# Patient Record
Sex: MALE | Race: BLACK OR AFRICAN AMERICAN | Employment: FULL TIME | URBAN - METROPOLITAN AREA
[De-identification: names, ages, dates, MRNs, and addresses within clinical notes are randomized per-mention and may not be internally consistent; named-entity substitution may affect disease eponyms.]

---

## 2018-03-09 ENCOUNTER — HOSPITAL ENCOUNTER (OUTPATIENT)
Age: 43
Discharge: HOME | End: 2018-03-09
Payer: COMMERCIAL

## 2018-03-09 ENCOUNTER — HOSPITAL ENCOUNTER (OUTPATIENT)
Dept: GENERAL RADIOLOGY | Age: 43
Discharge: HOME | End: 2018-03-11
Payer: COMMERCIAL

## 2018-03-09 DIAGNOSIS — T14.90XA INJURY: ICD-10-CM

## 2018-03-09 PROCEDURE — 73130 X-RAY EXAM OF HAND: CPT

## 2018-09-13 ENCOUNTER — HOSPITAL ENCOUNTER (EMERGENCY)
Age: 43
Discharge: HOME | End: 2018-09-14
Attending: EMERGENCY MEDICINE
Payer: COMMERCIAL

## 2018-09-13 VITALS
HEIGHT: 73 IN | SYSTOLIC BLOOD PRESSURE: 168 MMHG | OXYGEN SATURATION: 96 % | TEMPERATURE: 97.6 F | HEART RATE: 75 BPM | WEIGHT: 234.19 LBS | BODY MASS INDEX: 31.04 KG/M2 | DIASTOLIC BLOOD PRESSURE: 108 MMHG | RESPIRATION RATE: 18 BRPM

## 2018-09-13 DIAGNOSIS — L03.319 CELLULITIS AND ABSCESS OF TRUNK: Primary | ICD-10-CM

## 2018-09-13 DIAGNOSIS — L02.219 CELLULITIS AND ABSCESS OF TRUNK: Primary | ICD-10-CM

## 2018-09-13 DIAGNOSIS — T63.301A SPIDER BITE WOUND, ACCIDENTAL OR UNINTENTIONAL, INITIAL ENCOUNTER: ICD-10-CM

## 2018-09-13 PROCEDURE — 99281 EMR DPT VST MAYX REQ PHY/QHP: CPT

## 2018-09-13 RX ORDER — DOXYCYCLINE 100 MG/1
100 TABLET ORAL 2 TIMES DAILY
Qty: 20 TABLET | Refills: 0 | Status: SHIPPED | OUTPATIENT
Start: 2018-09-13 | End: 2018-09-23

## 2018-09-13 RX ORDER — KETOROLAC TROMETHAMINE 30 MG/ML
60 INJECTION, SOLUTION INTRAMUSCULAR; INTRAVENOUS ONCE
Status: COMPLETED | OUTPATIENT
Start: 2018-09-14 | End: 2018-09-14

## 2018-09-13 RX ORDER — ONDANSETRON 4 MG/1
4 TABLET, ORALLY DISINTEGRATING ORAL ONCE
Status: COMPLETED | OUTPATIENT
Start: 2018-09-14 | End: 2018-09-14

## 2018-09-13 RX ORDER — IBUPROFEN 800 MG/1
800 TABLET ORAL EVERY 8 HOURS PRN
Qty: 30 TABLET | Refills: 0 | Status: SHIPPED | OUTPATIENT
Start: 2018-09-13 | End: 2020-07-20

## 2018-09-13 RX ORDER — CLINDAMYCIN HYDROCHLORIDE 300 MG/1
300 CAPSULE ORAL 3 TIMES DAILY
Qty: 30 CAPSULE | Refills: 0 | Status: SHIPPED | OUTPATIENT
Start: 2018-09-13 | End: 2018-09-23

## 2018-09-13 RX ORDER — OXYCODONE HYDROCHLORIDE AND ACETAMINOPHEN 5; 325 MG/1; MG/1
1 TABLET ORAL ONCE
Status: COMPLETED | OUTPATIENT
Start: 2018-09-14 | End: 2018-09-14

## 2018-09-13 RX ORDER — CLINDAMYCIN HYDROCHLORIDE 150 MG/1
300 CAPSULE ORAL ONCE
Status: COMPLETED | OUTPATIENT
Start: 2018-09-14 | End: 2018-09-14

## 2018-09-13 RX ORDER — DOXYCYCLINE 100 MG/1
100 CAPSULE ORAL ONCE
Status: COMPLETED | OUTPATIENT
Start: 2018-09-14 | End: 2018-09-14

## 2018-09-13 RX ORDER — OXYCODONE HYDROCHLORIDE AND ACETAMINOPHEN 5; 325 MG/1; MG/1
1-2 TABLET ORAL EVERY 6 HOURS PRN
Qty: 20 TABLET | Refills: 0 | Status: SHIPPED | OUTPATIENT
Start: 2018-09-13 | End: 2018-09-18

## 2018-09-13 ASSESSMENT — PAIN DESCRIPTION - PAIN TYPE
TYPE: ACUTE PAIN
TYPE: ACUTE PAIN

## 2018-09-13 ASSESSMENT — PAIN DESCRIPTION - ORIENTATION
ORIENTATION: UPPER
ORIENTATION: RIGHT;UPPER

## 2018-09-13 ASSESSMENT — PAIN SCALES - GENERAL
PAINLEVEL_OUTOF10: 7
PAINLEVEL_OUTOF10: 7

## 2018-09-13 ASSESSMENT — PAIN DESCRIPTION - LOCATION
LOCATION: FLANK
LOCATION: RIB CAGE

## 2018-09-14 PROCEDURE — 96372 THER/PROPH/DIAG INJ SC/IM: CPT

## 2018-09-14 PROCEDURE — 6370000000 HC RX 637 (ALT 250 FOR IP): Performed by: EMERGENCY MEDICINE

## 2018-09-14 PROCEDURE — 6360000002 HC RX W HCPCS: Performed by: EMERGENCY MEDICINE

## 2018-09-14 RX ADMIN — DOXYCYCLINE 100 MG: 100 CAPSULE ORAL at 00:15

## 2018-09-14 RX ADMIN — OXYCODONE HYDROCHLORIDE AND ACETAMINOPHEN 1 TABLET: 5; 325 TABLET ORAL at 00:15

## 2018-09-14 RX ADMIN — CLINDAMYCIN HYDROCHLORIDE 300 MG: 150 CAPSULE ORAL at 00:15

## 2018-09-14 RX ADMIN — ONDANSETRON 4 MG: 4 TABLET, ORALLY DISINTEGRATING ORAL at 00:15

## 2018-09-14 RX ADMIN — KETOROLAC TROMETHAMINE 60 MG: 30 INJECTION, SOLUTION INTRAMUSCULAR at 00:14

## 2018-09-14 ASSESSMENT — PAIN SCALES - GENERAL: PAINLEVEL_OUTOF10: 7

## 2018-09-14 NOTE — ED NOTES
Patient walks back to room with complaint of insect bite. Patient states this has been going on for 1 week. He states that it itched initially, but has not been scratching at it since. Patient has a history of abscesses. Patient is alert and oriented x 4, walks back to room with no aids. Respirations even and unlabored, heart rate strong and regular. Patient has a reddened, raised area approximately 5 cm laterally to the right nipple. No drainage noted.      Flip Ghotra, KATHERYN  09/14/18 0125

## 2020-07-20 ENCOUNTER — OFFICE VISIT (OUTPATIENT)
Dept: FAMILY MEDICINE CLINIC | Age: 45
End: 2020-07-20
Payer: COMMERCIAL

## 2020-07-20 ENCOUNTER — HOSPITAL ENCOUNTER (OUTPATIENT)
Age: 45
Setting detail: SPECIMEN
Discharge: HOME | End: 2020-07-20
Payer: COMMERCIAL

## 2020-07-20 VITALS
WEIGHT: 227 LBS | SYSTOLIC BLOOD PRESSURE: 154 MMHG | TEMPERATURE: 97.2 F | BODY MASS INDEX: 30.09 KG/M2 | DIASTOLIC BLOOD PRESSURE: 104 MMHG | HEIGHT: 73 IN | HEART RATE: 85 BPM

## 2020-07-20 PROBLEM — Z83.3 FAMILY HISTORY OF DIABETES MELLITUS: Status: ACTIVE | Noted: 2020-07-20

## 2020-07-20 PROBLEM — I10 ESSENTIAL HYPERTENSION: Status: ACTIVE | Noted: 2020-07-20

## 2020-07-20 PROBLEM — Z13.220 SCREENING FOR HYPERLIPIDEMIA: Status: ACTIVE | Noted: 2020-07-20

## 2020-07-20 LAB
CHOLESTEROL/HDL RATIO: 4.5
CHOLESTEROL: 195 MG/DL
ESTIMATED AVERAGE GLUCOSE: 111 MG/DL
HBA1C MFR BLD: 5.5 % (ref 4–6)
HDLC SERPL-MCNC: 43 MG/DL
LDL CHOLESTEROL: 122 MG/DL (ref 0–130)
TRIGL SERPL-MCNC: 149 MG/DL
VLDLC SERPL CALC-MCNC: NORMAL MG/DL (ref 1–30)

## 2020-07-20 PROCEDURE — 99203 OFFICE O/P NEW LOW 30 MIN: CPT | Performed by: STUDENT IN AN ORGANIZED HEALTH CARE EDUCATION/TRAINING PROGRAM

## 2020-07-20 RX ORDER — AMLODIPINE BESYLATE 5 MG/1
5 TABLET ORAL DAILY
Qty: 30 TABLET | Refills: 1 | Status: SHIPPED | OUTPATIENT
Start: 2020-07-20 | End: 2020-08-14 | Stop reason: SDUPTHER

## 2020-07-20 RX ORDER — AMLODIPINE BESYLATE 5 MG/1
5 TABLET ORAL DAILY
COMMUNITY
End: 2020-07-20 | Stop reason: SDUPTHER

## 2020-07-20 ASSESSMENT — ENCOUNTER SYMPTOMS
ABDOMINAL PAIN: 0
SHORTNESS OF BREATH: 0
CHEST TIGHTNESS: 0
BACK PAIN: 0
NAUSEA: 0
WHEEZING: 0
DIARRHEA: 0
COUGH: 0
ABDOMINAL DISTENTION: 0
VOMITING: 0

## 2020-07-20 ASSESSMENT — PATIENT HEALTH QUESTIONNAIRE - PHQ9
SUM OF ALL RESPONSES TO PHQ QUESTIONS 1-9: 0
SUM OF ALL RESPONSES TO PHQ9 QUESTIONS 1 & 2: 0
2. FEELING DOWN, DEPRESSED OR HOPELESS: 0
SUM OF ALL RESPONSES TO PHQ QUESTIONS 1-9: 0
1. LITTLE INTEREST OR PLEASURE IN DOING THINGS: 0

## 2020-07-20 NOTE — PROGRESS NOTES
Subjective:      Patient ID: Emma Juares is a 54 y.o. male. Pt presents to reestablish care   Followed with dr Kaylen Shaffer years ago  Pt has hx of htn, treated w/ Norvasc, has been out of medication x 3 months  Pt denies chest pain, vision, SOB, urinary changes  Pt reports activity level - at work is active, heavy lifting, more sedentary during quarantine  Hx of remote wrist surgery, denies sequelae, hernia repair > 15 years ago, denies any issues at this time  FHx positive for dm, cancer in gm who was a smoker (lung)   ------------------------------------------               07/20/20 07/20/20                    1315            1322       ------------------------------------------   BP:        (!) 163/109    (!) 154/104     Site:    Right Upper Arm Left Upper Arm   Position:     Sitting        Sitting       Cuff Size:  Medium Adult    Medium Adult    Pulse:         85                         Temp:   97.2 °F (36.2 °C)                 TempSrc:    Temporal                      Weight:  227 lb (103 kg)                  Height:  6' 1\" (1.854 m)                 ------------------------------------------  Denies smoking cigarettes, EtOH 1/week socially, denies illicit substance use      Review of Systems   Constitutional: Negative for activity change, appetite change, chills, fever and unexpected weight change. Respiratory: Negative for cough, chest tightness, shortness of breath and wheezing. Cardiovascular: Negative for chest pain, palpitations and leg swelling. Gastrointestinal: Negative for abdominal distention, abdominal pain, diarrhea, nausea and vomiting. Genitourinary: Negative for difficulty urinating and flank pain. Musculoskeletal: Negative for arthralgias and back pain. Skin: Negative for rash. Neurological: Negative for dizziness, syncope, weakness and light-headedness. Psychiatric/Behavioral: Negative for dysphoric mood.  The patient is not nervous/anxious. Objective:   Physical Exam  Vitals signs reviewed. Constitutional:       General: He is not in acute distress. Appearance: He is well-developed. He is not diaphoretic. HENT:      Head: Normocephalic and atraumatic. Cardiovascular:      Rate and Rhythm: Normal rate and regular rhythm. Heart sounds: Normal heart sounds. No murmur. No gallop. Pulmonary:      Effort: Pulmonary effort is normal. No respiratory distress. Breath sounds: Normal breath sounds. No wheezing or rales. Abdominal:      General: Bowel sounds are normal. There is no distension. Palpations: Abdomen is soft. Tenderness: There is no abdominal tenderness. There is no guarding. Musculoskeletal: Normal range of motion. General: No tenderness. Skin:     General: Skin is warm and dry. Findings: No rash. Neurological:      Mental Status: He is alert and oriented to person, place, and time. Sensory: No sensory deficit. Psychiatric:         Judgment: Judgment normal.       Vitals:    07/20/20 1315 07/20/20 1322   BP: (!) 163/109 (!) 154/104   Site: Right Upper Arm Left Upper Arm   Position: Sitting Sitting   Cuff Size: Medium Adult Medium Adult   Pulse: 85    Temp: 97.2 °F (36.2 °C)    TempSrc: Temporal    Weight: 227 lb (103 kg)    Height: 6' 1\" (1.854 m)      Assessment:       Diagnosis Orders   1. Essential hypertension  amLODIPine (NORVASC) 5 MG tablet   2. Screening for hyperlipidemia  Lipid Panel   3.  Family history of diabetes mellitus  Hemoglobin A1C         Plan:      - Letter given per patient request outlining the work-up and treatment of uncontrolled HTN at this time  - Lipid panel ordered  - A1c ordered - fam hx of diabetes  - f/u 1 month - HTN        Ayse Fletcher MD

## 2020-07-20 NOTE — LETTER
[address]  [address]  [place] [address]  Phone: [phone]  Fax: [phone]    Stuart Bolaños MD        July 20, 2020     Patient: Toan Vega   YOB: 1975   Date of Visit: 7/20/2020       To Whom It May Concern: It is my medical opinion that Salvatore Zapien is a patient that recently established care with me as the primary care physician. At this time, the hypertension (high blood pressure) is uncontrolled, being treated with medication, and being followed up in 1 month for further reassessment with possible medication increases or changes. .    If you have any questions or concerns, please don't hesitate to call.       Sincerely,            Stuart Bolaños MD

## 2020-07-20 NOTE — PROGRESS NOTES
Visit Information    Have you changed or started any medications since your last visit including any over-the-counter medicines, vitamins, or herbal medicines? no   Have you stopped taking any of your medications? Is so, why? -  no  Are you having any side effects from any of your medications? - no    Have you seen any other physician or provider since your last visit?  no   Have you had any other diagnostic tests since your last visit?  no   Have you been seen in the emergency room and/or had an admission in a hospital since we last saw you?  no   Have you had your routine dental cleaning in the past 6 months?  no     Do you have an active MyChart account? If no, what is the barrier?   Yes    Patient Care Team:  Revonda Fleischer, MD as PCP - General (Family Medicine)    Medical History Review  Past Medical, Family, and Social History reviewed and does not contribute to the patient presenting condition    Health Maintenance   Topic Date Due    HIV screen  05/06/1989    DTaP/Tdap/Td vaccine (1 - Tdap) 05/06/1993    Lipid screen  05/06/2014    Diabetes screen  05/06/2014    Flu vaccine (1) 09/01/2020    Hepatitis A vaccine  Aged Out    Hepatitis B vaccine  Aged Out    Hib vaccine  Aged Out    Meningococcal (ACWY) vaccine  Aged Out    Pneumococcal 0-64 years Vaccine  Aged Out

## 2020-07-21 ENCOUNTER — TELEPHONE (OUTPATIENT)
Dept: FAMILY MEDICINE CLINIC | Age: 45
End: 2020-07-21

## 2020-07-22 ENCOUNTER — TELEPHONE (OUTPATIENT)
Dept: FAMILY MEDICINE CLINIC | Age: 45
End: 2020-07-22

## 2020-07-22 NOTE — TELEPHONE ENCOUNTER
Patient called in stating that his job is making him go back to work this Friday but the patient states he is at risk because of his blood pressure for the Covid-19 and he do not want to go back, patient want you to writer a letter so he don't have to go back work.

## 2020-07-23 NOTE — TELEPHONE ENCOUNTER
Addressed in other encounter, updated letter available.   Ayse Fletcher MD  [facility] Resident, PGY-3  7/23/2020

## 2020-07-24 ENCOUNTER — TELEPHONE (OUTPATIENT)
Dept: FAMILY MEDICINE CLINIC | Age: 45
End: 2020-07-24

## 2020-07-24 NOTE — TELEPHONE ENCOUNTER
If pt calls asking for an updated letter from Dr. Mounika Mock, the patient can be told that the letter is in the front office and he can pick it up today.

## 2020-08-14 ENCOUNTER — OFFICE VISIT (OUTPATIENT)
Dept: FAMILY MEDICINE CLINIC | Age: 45
End: 2020-08-14
Payer: COMMERCIAL

## 2020-08-14 VITALS
HEIGHT: 73 IN | DIASTOLIC BLOOD PRESSURE: 98 MMHG | HEART RATE: 86 BPM | WEIGHT: 236 LBS | BODY MASS INDEX: 31.28 KG/M2 | TEMPERATURE: 98.1 F | SYSTOLIC BLOOD PRESSURE: 146 MMHG

## 2020-08-14 PROCEDURE — 99213 OFFICE O/P EST LOW 20 MIN: CPT | Performed by: STUDENT IN AN ORGANIZED HEALTH CARE EDUCATION/TRAINING PROGRAM

## 2020-08-14 RX ORDER — AMLODIPINE BESYLATE 10 MG/1
10 TABLET ORAL DAILY
Qty: 30 TABLET | Refills: 1 | Status: SHIPPED | OUTPATIENT
Start: 2020-08-14 | End: 2020-09-11 | Stop reason: SDUPTHER

## 2020-08-14 ASSESSMENT — ENCOUNTER SYMPTOMS
WHEEZING: 0
ABDOMINAL PAIN: 0
DIARRHEA: 0
BACK PAIN: 0
ABDOMINAL DISTENTION: 0
NAUSEA: 0
SHORTNESS OF BREATH: 0
CHEST TIGHTNESS: 0
VOMITING: 0
COUGH: 0

## 2020-08-14 NOTE — PROGRESS NOTES
HYPERTENSION visit     BP Readings from Last 3 Encounters:   07/20/20 (!) 154/104   09/13/18 (!) 168/108       LDL Cholesterol (mg/dL)   Date Value   07/20/2020 122     HDL (mg/dL)   Date Value   07/20/2020 43              Have you changed or started any medications since your last visit including any over-the-counter medicines, vitamins, or herbal medicines? no   Have you stopped taking any of your medications? Is so, why? -  no  Are you having any side effects from any of your medications? - no  How often do you miss doses of your medication? no      Have you seen any other physician or provider since your last visit?  no   Have you had any other diagnostic tests since your last visit?  no   Have you been seen in the emergency room and/or had an admission in a hospital since we last saw you?  no   Have you had your routine dental cleaning in the past 6 months?  no     Do you have an active MyChart account? If no, what is the barrier?   Yes    Patient Care Team:  Talya Luciano MD as PCP - General (Family Medicine)    Medical History Review  Past Medical, Family, and Social History reviewed and does not contribute to the patient presenting condition    Health Maintenance   Topic Date Due    Potassium monitoring  1975    Creatinine monitoring  1975    HIV screen  05/06/1989    DTaP/Tdap/Td vaccine (1 - Tdap) 05/06/1993    Flu vaccine (1) 09/01/2020    Diabetes screen  07/20/2023    Lipid screen  07/20/2025    Hepatitis A vaccine  Aged Out    Hepatitis B vaccine  Aged Out    Hib vaccine  Aged Out    Meningococcal (ACWY) vaccine  Aged Out    Pneumococcal 0-64 years Vaccine  Aged Out

## 2020-08-14 NOTE — PROGRESS NOTES
Subjective:      Patient ID: Adonis Temple is a 54 y.o. male. Pt presents for f/u HTN  Pt has been adherent to medication  Remains uncontrolled at this time  Amenable to increased dose  -------------------------------------------               08/14/20 08/14/20                     1441            1449        -------------------------------------------   BP:        (!) 162/100     (!) 146/98      Site:    Left Lower Arm  Right Upper Arm   Position:     Sitting         Sitting       Cuff Size:  Medium Adult    Medium Adult     Pulse:         86                          Temp:   98.1 °F (36.7 °C)                  TempSrc:    Temporal                       Weight:  236 lb (107 kg)                   Height:  6' 1\" (1.854 m)                  -------------------------------------------  Pt denies chest pain, N/V, HA, vision changes, urinary changes  Pt requests another letter today for next 4 - weeks , similar from last visit        Review of Systems   Constitutional: Negative for activity change, appetite change, chills, fever and unexpected weight change. Respiratory: Negative for cough, chest tightness, shortness of breath and wheezing. Cardiovascular: Negative for chest pain, palpitations and leg swelling. Gastrointestinal: Negative for abdominal distention, abdominal pain, diarrhea, nausea and vomiting. Genitourinary: Negative for difficulty urinating and flank pain. Musculoskeletal: Negative for arthralgias and back pain. Skin: Negative for rash. Neurological: Negative for dizziness, syncope, weakness and light-headedness. Psychiatric/Behavioral: Negative for dysphoric mood. The patient is not nervous/anxious. Objective:   Physical Exam  Vitals signs reviewed. Constitutional:       General: He is not in acute distress. Appearance: He is well-developed. He is not diaphoretic. HENT:      Head: Normocephalic and atraumatic.

## 2020-08-14 NOTE — PROGRESS NOTES
Attending Physician Statement  I  have discussed the care of Yun Zimmerman including pertinent history and exam findings with the resident. I agree with the assessment, plan and orders as documented by the resident. BP (!) 146/98 (Site: Right Upper Arm, Position: Sitting, Cuff Size: Medium Adult)   Pulse 86   Temp 98.1 °F (36.7 °C) (Temporal)   Ht 6' 1\" (1.854 m)   Wt 236 lb (107 kg)   BMI 31.14 kg/m²    BP Readings from Last 3 Encounters:   08/14/20 (!) 146/98   07/20/20 (!) 154/104   09/13/18 (!) 168/108     Wt Readings from Last 3 Encounters:   08/14/20 236 lb (107 kg)   07/20/20 227 lb (103 kg)   09/13/18 234 lb 3 oz (106.2 kg)          Diagnosis Orders   1.  Essential hypertension  Basic Metabolic Panel    amLODIPine (NORVASC) 10 MG tablet       Harris Nava DO 8/14/2020 3:20 PM

## 2020-08-14 NOTE — PATIENT INSTRUCTIONS
Thank you for letting us take care of you today. We hope all your questions were addressed. If a question was overlooked or something else comes to mind after you return home, please contact a member of your Care Team listed below. Your Care Team at [place] [address] is Team #4  Jayjay Meier MD (Faculty)  Vida Pedroza MD (Resident)  Leticia Wilcox MD (Resident)  Maninder Mg MD (Resident)  Drake Knight MD (Resident)  GRABIEL Schwarz RMA [place] Dense., [facility] office)  Mariluz Perez, [address] (Clinical Practice Manager)  Carlene Bah [facility] (Clinical Pharmacist)       Office phone number: [phone]    If you need to get in right away due to illness, please be advised we have \"Same Day\" appointments available Monday-Friday. Please call us at [phone] option #3 to schedule your \"Same Day\" appointment.

## 2020-08-19 PROBLEM — Z13.220 SCREENING FOR HYPERLIPIDEMIA: Status: RESOLVED | Noted: 2020-07-20 | Resolved: 2020-08-19

## 2020-08-31 ENCOUNTER — HOSPITAL ENCOUNTER (OUTPATIENT)
Age: 45
Setting detail: SPECIMEN
Discharge: HOME | End: 2020-08-31
Payer: COMMERCIAL

## 2020-08-31 LAB
ANION GAP SERPL CALCULATED.3IONS-SCNC: 13 MMOL/L (ref 9–17)
BUN BLDV-MCNC: 14 MG/DL (ref 6–20)
BUN/CREAT BLD: ABNORMAL (ref 9–20)
CALCIUM SERPL-MCNC: 9 MG/DL (ref 8.6–10.4)
CHLORIDE BLD-SCNC: 104 MMOL/L (ref 98–107)
CO2: 22 MMOL/L (ref 20–31)
CREAT SERPL-MCNC: 1.18 MG/DL (ref 0.7–1.2)
GFR AFRICAN AMERICAN: >60 ML/MIN
GFR NON-AFRICAN AMERICAN: >60 ML/MIN
GFR SERPL CREATININE-BSD FRML MDRD: ABNORMAL ML/MIN/{1.73_M2}
GFR SERPL CREATININE-BSD FRML MDRD: ABNORMAL ML/MIN/{1.73_M2}
GLUCOSE BLD-MCNC: 127 MG/DL (ref 70–99)
POTASSIUM SERPL-SCNC: 4.1 MMOL/L (ref 3.7–5.3)
SODIUM BLD-SCNC: 139 MMOL/L (ref 135–144)

## 2020-09-11 ENCOUNTER — OFFICE VISIT (OUTPATIENT)
Dept: FAMILY MEDICINE CLINIC | Age: 45
End: 2020-09-11
Payer: COMMERCIAL

## 2020-09-11 VITALS
TEMPERATURE: 97.9 F | BODY MASS INDEX: 29.95 KG/M2 | SYSTOLIC BLOOD PRESSURE: 168 MMHG | DIASTOLIC BLOOD PRESSURE: 102 MMHG | HEART RATE: 106 BPM | HEIGHT: 73 IN | WEIGHT: 226 LBS

## 2020-09-11 PROCEDURE — 99213 OFFICE O/P EST LOW 20 MIN: CPT | Performed by: STUDENT IN AN ORGANIZED HEALTH CARE EDUCATION/TRAINING PROGRAM

## 2020-09-11 RX ORDER — LISINOPRIL 10 MG/1
10 TABLET ORAL DAILY
Qty: 90 TABLET | Refills: 1 | Status: SHIPPED | OUTPATIENT
Start: 2020-09-11 | End: 2020-12-02 | Stop reason: SDUPTHER

## 2020-09-11 RX ORDER — AMLODIPINE BESYLATE 10 MG/1
10 TABLET ORAL DAILY
Qty: 30 TABLET | Refills: 1 | Status: SHIPPED | OUTPATIENT
Start: 2020-09-11 | End: 2020-10-26 | Stop reason: SINTOL

## 2020-09-11 ASSESSMENT — ENCOUNTER SYMPTOMS
CHEST TIGHTNESS: 0
VOMITING: 0
COUGH: 0
WHEEZING: 0
ABDOMINAL PAIN: 0
NAUSEA: 0
BACK PAIN: 0
ABDOMINAL DISTENTION: 0
SHORTNESS OF BREATH: 0
DIARRHEA: 0

## 2020-09-11 NOTE — PATIENT INSTRUCTIONS
Thank you for letting us take care of you today. We hope all your questions were addressed. If a question was overlooked or something else comes to mind after you return home, please contact a member of your Care Team listed below. Your Care Team at [place] [address] is Team #4  Brenden Raimrez MD (Faculty)  Juwan Baxter MD (Resident)  June Berry MD (Resident)  Rayshawn Canada MD (Resident)  Parisa Trujillo MD (Resident)  GRABIEL Weber RMA Perri Margo., [facility] office)  Holland Rivera, [address] [facility] (Clinical Practice Manager)  Saqib Whitmore, [facility] (Clinical Pharmacist)       Office phone number: [phone]    If you need to get in right away due to illness, please be advised we have \"Same Day\" appointments available Monday-Friday. Please call us at [phone] option #3 to schedule your \"Same Day\" appointment.

## 2020-09-11 NOTE — PROGRESS NOTES
Attending Physician Statement  I have discussed the care of TremonHintonincluding pertinent history and exam findings,  with the resident. I have reviewed the key elements of all parts of the encounter with the resident. I agree with the assessment, plan and orders as documented by the resident.   (GE Modifier)    HTN- add Lisinopril 10 mg

## 2020-09-11 NOTE — PROGRESS NOTES
Subjective:      Patient ID: Graciela Vidales is a 54 y.o. male. Pt presents for f/u HTN  ------------------------------------------               09/11/20 09/11/20                    1105            1112       ------------------------------------------   BP:        (!) 174/109    (!) 168/102     Site:    Right Upper Arm Left Upper Arm   Position:     Sitting        Sitting       Cuff Size:  Medium Adult    Medium Adult    Pulse:         106                        Temp:   97.9 °F (36.6 °C)                 TempSrc:    Temporal                      Weight: 226 lb (102.5 kg)                 Height:  6' 1\" (1.854 m)                 ------------------------------------------  Uncontrolled today, worse than prior  Reports adherent with Norvasc, amenable to 2nd agent  Denies CP, N/V, SOB  Intermittent HA, denies persistent, denies associated with vision changes  Discussed if any neurological symptoms present and when to present to ED, not currently in urgency range    Review of Systems   Constitutional: Negative for activity change, appetite change, chills, fever and unexpected weight change. Respiratory: Negative for cough, chest tightness, shortness of breath and wheezing. Cardiovascular: Negative for chest pain, palpitations and leg swelling. Gastrointestinal: Negative for abdominal distention, abdominal pain, diarrhea, nausea and vomiting. Genitourinary: Negative for difficulty urinating and flank pain. Musculoskeletal: Negative for arthralgias and back pain. Skin: Negative for rash. Neurological: Negative for dizziness, syncope, weakness and light-headedness. Psychiatric/Behavioral: Negative for dysphoric mood. The patient is not nervous/anxious. Objective:   Physical Exam  Vitals signs reviewed. Constitutional:       General: He is not in acute distress. Appearance: He is well-developed. He is not diaphoretic.    HENT:      Head: Normocephalic and atraumatic. Cardiovascular:      Rate and Rhythm: Normal rate and regular rhythm. Heart sounds: Normal heart sounds. No murmur. No gallop. Pulmonary:      Effort: Pulmonary effort is normal. No respiratory distress. Breath sounds: Normal breath sounds. No wheezing or rales. Abdominal:      General: Bowel sounds are normal. There is no distension. Palpations: Abdomen is soft. Tenderness: There is no abdominal tenderness. There is no guarding. Musculoskeletal: Normal range of motion. General: No tenderness. Skin:     General: Skin is warm and dry. Findings: No rash. Neurological:      Mental Status: He is alert and oriented to person, place, and time. Sensory: No sensory deficit. Psychiatric:         Judgment: Judgment normal.       Vitals:    09/11/20 1105 09/11/20 1112   BP: (!) 174/109 (!) 168/102   Site: Right Upper Arm Left Upper Arm   Position: Sitting Sitting   Cuff Size: Medium Adult Medium Adult   Pulse: 106    Temp: 97.9 °F (36.6 °C)    TempSrc: Temporal    Weight: 226 lb (102.5 kg)    Height: 6' 1\" (1.854 m)      Assessment:       Diagnosis Orders   1.  Essential hypertension  lisinopril (PRINIVIL;ZESTRIL) 10 MG tablet    amLODIPine (NORVASC) 10 MG tablet         Plan:      - Avoid NSAIDs currently with uncontrolled HTN  - Start lisinopril, recent BMP 8/31/2020 GFR > 60  - c/w Norvasc  - Restrict salt intake  - Letter for work until next visit - as documented prior, concern from patient and family regarding studies showing increased CV risk related to COVID exposure        Junaid Luo MD

## 2020-09-24 ENCOUNTER — TELEPHONE (OUTPATIENT)
Dept: FAMILY MEDICINE CLINIC | Age: 45
End: 2020-09-24

## 2020-09-24 NOTE — TELEPHONE ENCOUNTER
Pt f/u appt for 10/12 was rescheduled due to provider schedule changes. Next opening was 10/26.  Needs updated letter for return to work date to be after f/u appt, previous letter had him out until 10/13 but f/u appt was rescheduled due to clinic changes     Please review and advise    Pt employer fax #:[phone] [employer]     Pt call [facility]:[phone]

## 2020-09-24 NOTE — LETTER
[facility]  [address]  [address] [place] [address]  Phone: [phone]  Fax: [phone]    Era Ash MD      October 13, 2020     Patient: Sarahi Ahumada   YOB: 1975   Date of Visit: 9/24/2020       To Whom it May Concern:    Patient is scheduled to be seen in the office on 10/26/2020,  He may return to work on 10/27/2020. If you have any questions or concerns, please don't hesitate to call.     Sincerely,         Era Ash MD

## 2020-10-13 NOTE — TELEPHONE ENCOUNTER
Patient called back again today asking for his letter for work. Patient is supposed to return to work today. Needs a letter excusing him until he sees the physician on 10/26/2020 for a RTW. Writer spoke with Dr. Ivan Lawson, and he said he will sign the work note.

## 2020-10-26 ENCOUNTER — OFFICE VISIT (OUTPATIENT)
Dept: FAMILY MEDICINE CLINIC | Age: 45
End: 2020-10-26
Payer: COMMERCIAL

## 2020-10-26 VITALS
HEART RATE: 94 BPM | HEIGHT: 73 IN | DIASTOLIC BLOOD PRESSURE: 101 MMHG | SYSTOLIC BLOOD PRESSURE: 149 MMHG | WEIGHT: 225.6 LBS | BODY MASS INDEX: 29.9 KG/M2 | TEMPERATURE: 97.6 F

## 2020-10-26 PROCEDURE — 99213 OFFICE O/P EST LOW 20 MIN: CPT | Performed by: STUDENT IN AN ORGANIZED HEALTH CARE EDUCATION/TRAINING PROGRAM

## 2020-10-26 PROCEDURE — 99211 OFF/OP EST MAY X REQ PHY/QHP: CPT | Performed by: FAMILY MEDICINE

## 2020-10-26 RX ORDER — HYDROCHLOROTHIAZIDE 25 MG/1
25 TABLET ORAL EVERY MORNING
Qty: 90 TABLET | Refills: 1 | Status: SHIPPED | OUTPATIENT
Start: 2020-10-26 | End: 2020-12-02 | Stop reason: SDUPTHER

## 2020-10-26 ASSESSMENT — ENCOUNTER SYMPTOMS
ABDOMINAL DISTENTION: 0
CHEST TIGHTNESS: 0
VOMITING: 0
WHEEZING: 0
NAUSEA: 0
DIARRHEA: 0
ABDOMINAL PAIN: 0
SHORTNESS OF BREATH: 0
COUGH: 0
BACK PAIN: 0

## 2020-10-26 ASSESSMENT — PATIENT HEALTH QUESTIONNAIRE - PHQ9
SUM OF ALL RESPONSES TO PHQ QUESTIONS 1-9: 0
SUM OF ALL RESPONSES TO PHQ QUESTIONS 1-9: 0
2. FEELING DOWN, DEPRESSED OR HOPELESS: 0
1. LITTLE INTEREST OR PLEASURE IN DOING THINGS: 0
SUM OF ALL RESPONSES TO PHQ QUESTIONS 1-9: 0
SUM OF ALL RESPONSES TO PHQ9 QUESTIONS 1 & 2: 0

## 2020-10-26 NOTE — PROGRESS NOTES
Subjective:      Patient ID: Radha Betancourt is a 54 y.o. male. Pt presents for f/u uncontrolled HTN  Pt remains elevated today on multiple readings  --------------------------------------------------                   10/26/20           10/26/20                         1325               1331        --------------------------------------------------   BP:           (!) 155/102         (!) 167/93      Site:       Right Upper Arm     Right Upper Arm   Position:        Sitting             Sitting       Cuff Size:      Large Adult         Large Adult     Pulse:             94                             Temp:      97.6 °F (36.4 °C)                      TempSrc:        Temporal                          Weight: 225 lb 9.6 oz (102.3 kg)                  Height:     6' 1\" (1.854 m)                      --------------------------------------------------  Pt denies CP, SOB, HA, vision changes  Pt reports stopping Norvasc on his own due to HA and dizziness that resolved after cessation  Discussed salt, caffeine intake, avoidance of alcohol and smoking as contributors  Pt amenable to trying new medication - HCTZ  Pt requests another work letter as per prior encounters      Review of Systems   Constitutional: Negative for activity change, appetite change, chills, fever and unexpected weight change. Respiratory: Negative for cough, chest tightness, shortness of breath and wheezing. Cardiovascular: Negative for chest pain, palpitations and leg swelling. Gastrointestinal: Negative for abdominal distention, abdominal pain, diarrhea, nausea and vomiting. Genitourinary: Negative for difficulty urinating and flank pain. Musculoskeletal: Negative for arthralgias and back pain. Skin: Negative for rash. Neurological: Positive for dizziness (only during norvasc, stopped after cessation) and headaches (only during norvasc, stopped after cessation).  Negative for syncope, weakness and light-headedness. Psychiatric/Behavioral: Negative for dysphoric mood. The patient is not nervous/anxious. Objective:   Physical Exam  Vitals signs reviewed. Constitutional:       General: He is not in acute distress. Appearance: He is well-developed. He is not diaphoretic. HENT:      Head: Normocephalic and atraumatic. Cardiovascular:      Rate and Rhythm: Normal rate and regular rhythm. Heart sounds: Normal heart sounds. No murmur. No gallop. Pulmonary:      Effort: Pulmonary effort is normal. No respiratory distress. Breath sounds: Normal breath sounds. No wheezing or rales. Abdominal:      General: Bowel sounds are normal. There is no distension. Palpations: Abdomen is soft. Tenderness: There is no abdominal tenderness. There is no guarding. Musculoskeletal: Normal range of motion. General: No tenderness. Skin:     General: Skin is warm and dry. Findings: No rash. Neurological:      Mental Status: He is alert and oriented to person, place, and time. Sensory: No sensory deficit. Psychiatric:         Judgment: Judgment normal.       Vitals:    10/26/20 1325 10/26/20 1331   BP: (!) 155/102 (!) 167/93   Site: Right Upper Arm Right Upper Arm   Position: Sitting Sitting   Cuff Size: Large Adult Large Adult   Pulse: 94    Temp: 97.6 °F (36.4 °C)    TempSrc: Temporal    Weight: 225 lb 9.6 oz (102.3 kg)    Height: 6' 1\" (1.854 m)        Assessment:       Diagnosis Orders   1.  Essential hypertension  hydroCHLOROthiazide (HYDRODIURIL) 25 MG tablet         Plan:      - Pt stopped taking Norvasc by himself, attributed to dizziness, headache  - Pt is taking lisinopril as prescribed  - Start HCTZ 25 mg qd  - Letter given for 4 weeks off work until next appt  - Refused flu vaccine, refused HIV  - f/u 4 weeks for uncontrolled hypertension         Peggy Olmos MD

## 2020-10-26 NOTE — LETTER
[address]  [address]  [address] [facility] [address]  Phone: [phone]  Fax: [phone]    Arvin Garcia MD            October 26, 2020     Patient: Christine Herron   YOB: 1975   Date of Visit: 10/26/2020       To Whom It May Concern: It is my medical opinion that Isela Ott should remain out of work until 11/26/2020. As from prior encounters, he remains uncontrolled HTN requiring the start of a new medication today. He will return on 112/05/2020 for a follow-up appointment at our office, if controlled HTN at that time will get a return to work letter. If you have any questions or concerns, please don't hesitate to call.     Sincerely,        Arvin Garcia MD

## 2020-10-26 NOTE — PATIENT INSTRUCTIONS
Thank you for letting us take care of you today. We hope all your questions were addressed. If a question was overlooked or something else comes to mind after you return home, please contact a member of your Care Team listed below. Your Care Team at [place] [address] is Team #5  Anastasia Wilkes MD (Faculty)  Jose Maria Trinidad MD (Resident)  Roddy Haines MD (Resident)  Ernst Chester MD (Resident)  GRABIEL Asif ,CARLOS WAHL, CARLOS Galvez ([address] [facility] office)  [facility] office)  Whit Carty, [address] (Clinical Practice Manager)  Grayson Cazares [facility] (Clinical Pharmacist)       Office phone number: [phone]    If you need to get in right away due to illness, please be advised we have \"Same Day\" appointments available Monday-Friday. Please call us at [phone] option #3 to schedule your \"Same Day\" appointment.

## 2020-10-26 NOTE — LETTER
[address]  [address]  [address] [facility] [address]  Phone: [phone]  Fax: [phone]    Philomena Varela MD        October 26, 2020     Patient: Fransisca Hermosillo   YOB: 1975   Date of Visit: 10/26/2020       To Whom It May Concern: It is my medical opinion that Haroon Chao should remain out of work until 11/26/2020. As from prior encounters, he remains uncontrolled HTN requiring the start of a new medication today. He will return on 11/26/2020 for a follow-up appointment at our office, if controlled HTN at that time will get a return to work letter. If you have any questions or concerns, please don't hesitate to call.     Sincerely,        Philomena Varela MD

## 2020-10-26 NOTE — LETTER
[address]  [address]  [place] [address]  Phone: [phone]  Fax: [phone]    Margarita Krishnan MD        October 26, 2020     Patient: Terese Tamayo   YOB: 1975   Date of Visit: 10/26/2020       To Whom It May Concern: It is my medical opinion that Duncan Tom should remain out of work until 11/26/2020. As from prior encounters, he remains uncontrolled HTN requiring the start of a new medication today. He will return on 11/26/2020 for a follow-up appointment at our office, if controlled HTN at that time will get a return to work letter. If you have any questions or concerns, please don't hesitate to call.     Sincerely,        Margarita Krishnan MD

## 2020-10-26 NOTE — PROGRESS NOTES
Visit Information    Have you changed or started any medications since your last visit including any over-the-counter medicines, vitamins, or herbal medicines? no   Have you stopped taking any of your medications? Is so, why? -  no  Are you having any side effects from any of your medications? - no    Have you seen any other physician or provider since your last visit?  no   Have you had any other diagnostic tests since your last visit?  no   Have you been seen in the emergency room and/or had an admission in a hospital since we last saw you?  no   Have you had your routine dental cleaning in the past 6 months?  no     Do you have an active MyChart account? If no, what is the barrier?   Yes    Patient Care Team:  Jack Conroy MD as PCP - General (Family Medicine)    Medical History Review  Past Medical, Family, and Social History reviewed and does contribute to the patient presenting condition    Health Maintenance   Topic Date Due    HIV screen  05/06/1989    DTaP/Tdap/Td vaccine (1 - Tdap) 05/06/1993    Flu vaccine (1) 09/01/2020    Potassium monitoring  08/31/2021    Creatinine monitoring  08/31/2021    Diabetes screen  07/20/2023    Lipid screen  07/20/2025    Hepatitis A vaccine  Aged Out    Hepatitis B vaccine  Aged Out    Hib vaccine  Aged Out    Meningococcal (ACWY) vaccine  Aged Out    Pneumococcal 0-64 years Vaccine  Aged Out

## 2020-10-26 NOTE — LETTER
[address]  [address]  [place] [address]  Phone: [phone]  Fax: [phone]    Margarita Krishnan MD        October 26, 2020     Patient: Terese Tamayo   YOB: 1975   Date of Visit: 10/26/2020       To Whom it May Concern:      October 26, 2020     Patient: Terese Tamayo   YOB: 1975   Date of Visit: 10/26/2020       To Whom It May Concern: It is my medical opinion that Duncan Tom should remain out of work until 11/26/2020. As from prior encounters, he remains uncontrolled HTN requiring the start of a new medication today. He will return on 12/05/2020  for a follow-up appointment at our office, if controlled HTN at that time will get a return to work letter. If you have any questions or concerns, please don't hesitate to call.     Sincerely,        Margarita Krishnan MD

## 2020-10-28 NOTE — PROGRESS NOTES
Attending Physician Statement    Wt Readings from Last 3 Encounters:   10/26/20 225 lb 9.6 oz (102.3 kg)   09/11/20 226 lb (102.5 kg)   08/14/20 236 lb (107 kg)     Temp Readings from Last 3 Encounters:   10/26/20 97.6 °F (36.4 °C) (Temporal)   09/11/20 97.9 °F (36.6 °C) (Temporal)   08/14/20 98.1 °F (36.7 °C) (Temporal)     BP Readings from Last 3 Encounters:   10/26/20 (!) 149/101   09/11/20 (!) 168/102   08/14/20 (!) 146/98     Pulse Readings from Last 3 Encounters:   10/26/20 94   09/11/20 106   08/14/20 86         I have discussed the care of Harris OWEN [address] [place] [address], including pertinent history and exam findings with the resident. I have reviewed the key elements of all parts of the encounter with the resident. I agree with the assessment, plan and orders as documented by the resident.   (GE Modifier)

## 2020-12-02 ENCOUNTER — OFFICE VISIT (OUTPATIENT)
Dept: FAMILY MEDICINE CLINIC | Age: 45
End: 2020-12-02
Payer: COMMERCIAL

## 2020-12-02 VITALS
SYSTOLIC BLOOD PRESSURE: 132 MMHG | HEIGHT: 73 IN | HEART RATE: 96 BPM | BODY MASS INDEX: 30.62 KG/M2 | TEMPERATURE: 97.2 F | WEIGHT: 231 LBS | DIASTOLIC BLOOD PRESSURE: 98 MMHG

## 2020-12-02 PROCEDURE — 99213 OFFICE O/P EST LOW 20 MIN: CPT | Performed by: STUDENT IN AN ORGANIZED HEALTH CARE EDUCATION/TRAINING PROGRAM

## 2020-12-02 RX ORDER — LISINOPRIL 20 MG/1
20 TABLET ORAL DAILY
Qty: 30 TABLET | Refills: 2 | Status: SHIPPED | OUTPATIENT
Start: 2020-12-02 | End: 2021-01-11 | Stop reason: SDUPTHER

## 2020-12-02 RX ORDER — HYDROCHLOROTHIAZIDE 25 MG/1
25 TABLET ORAL EVERY MORNING
Qty: 90 TABLET | Refills: 1 | Status: SHIPPED | OUTPATIENT
Start: 2020-12-02 | End: 2021-01-11 | Stop reason: SDUPTHER

## 2020-12-02 ASSESSMENT — ENCOUNTER SYMPTOMS
ABDOMINAL PAIN: 0
ABDOMINAL DISTENTION: 0
WHEEZING: 0
BACK PAIN: 0
COUGH: 0
DIARRHEA: 0
NAUSEA: 0
CHEST TIGHTNESS: 0
VOMITING: 0
SHORTNESS OF BREATH: 0

## 2020-12-02 NOTE — PROGRESS NOTES
Attending Physician Statement  I have discussed the care of Liz Fox 54 y.o. male, including pertinent history and exam findings, with the resident Dr. Cecille Chan MD.    History and Exam:   Chief Complaint   Patient presents with    Hypertension     follow up   6 [facility]     History reviewed. No pertinent past medical history. No Known Allergies   I have seen and examined the patient and the key elements of the encounter have been performed by me. BP Readings from Last 3 Encounters:   12/02/20 (!) 132/98   10/26/20 (!) 149/101   09/11/20 (!) 168/102     BP (!) 132/98 (Site: Left Upper Arm, Position: Sitting, Cuff Size: Medium Adult)   Pulse 96   Temp 97.2 °F (36.2 °C) (Temporal)   Ht 6' 1\" (1.854 m)   Wt 231 lb (104.8 kg)   BMI 30.48 kg/m²   Lab Results   Component Value Date    CHOL 195 07/20/2020    TRIG 149 07/20/2020    HDL 43 07/20/2020     08/31/2020    K 4.1 08/31/2020     08/31/2020    CREATININE 1.18 08/31/2020    BUN 14 08/31/2020    CO2 22 08/31/2020    LABA1C 5.5 07/20/2020     No results found for: LABPROT, LABALBU  No results found for: IRON, TIBC, FERRITIN  Lab Results   Component Value Date    LDLCHOLESTEROL 122 07/20/2020     I agree with the assessment, plan and the diagnosis of    Diagnosis Orders   1. Essential hypertension  lisinopril (PRINIVIL;ZESTRIL) 20 MG tablet    hydroCHLOROthiazide (HYDRODIURIL) 25 MG tablet    . I agree with orders as documented by the resident. Recommendations:  HTN follow up, Increased Lisinopril to 20 mg Daily. F/U in 4 weeks    More than 25 minutes spent  in face to face encounter with the patient and more than half in counseling. Patient's questions were answered. Patient Voiced understanding to the counseling. Return in about 4 weeks (around 12/30/2020) for f/u htn.    (GC Modifier)-Dr. Miles Navarro MD
HYPERTENSION visit     BP Readings from Last 3 Encounters:   10/26/20 (!) 149/101   09/11/20 (!) 168/102   08/14/20 (!) 146/98       LDL Cholesterol (mg/dL)   Date Value   07/20/2020 122     HDL (mg/dL)   Date Value   07/20/2020 43     BUN (mg/dL)   Date Value   08/31/2020 14     CREATININE (mg/dL)   Date Value   08/31/2020 1.18     Glucose (mg/dL)   Date Value   08/31/2020 127 (H)              Have you changed or started any medications since your last visit including any over-the-counter medicines, vitamins, or herbal medicines? no   Have you stopped taking any of your medications? Is so, why? -  no  Are you having any side effects from any of your medications? - no  How often do you miss doses of your medication? no      Have you seen any other physician or provider since your last visit?  no   Have you had any other diagnostic tests since your last visit?  no   Have you been seen in the emergency room and/or had an admission in a hospital since we last saw you?  no   Have you had your routine dental cleaning in the past 6 months?  no     Do you have an active MyChart account? If no, what is the barrier?   Yes    Patient Care Team:  Karla Mosley MD as PCP - General (Family Medicine)    Medical History Review  Past Medical, Family, and Social History reviewed and does contribute to the patient presenting condition    Health Maintenance   Topic Date Due    HIV screen  05/06/1989    DTaP/Tdap/Td vaccine (1 - Tdap) 05/06/1993    Flu vaccine (1) 09/01/2020    Potassium monitoring  08/31/2021    Creatinine monitoring  08/31/2021    Diabetes screen  07/20/2023    Lipid screen  07/20/2025    Hepatitis A vaccine  Aged Out    Hepatitis B vaccine  Aged Out    Hib vaccine  Aged Out    Meningococcal (ACWY) vaccine  Aged Out    Pneumococcal 0-64 years Vaccine  Aged Out
Skin:     General: Skin is warm and dry. Findings: No rash. Neurological:      Mental Status: He is alert and oriented to person, place, and time. Sensory: No sensory deficit. Psychiatric:         Judgment: Judgment normal.       Vitals:    12/02/20 1344 12/02/20 1359   BP: (!) 153/97 (!) 132/98   Site: Right Upper Arm Left Upper Arm   Position: Sitting Sitting   Cuff Size: Medium Adult Medium Adult   Pulse: 96    Temp: 97.2 °F (36.2 °C)    TempSrc: Temporal    Weight: 231 lb (104.8 kg)    Height: 6' 1\" (1.854 m)        Assessment:       Diagnosis Orders   1.  Essential hypertension  lisinopril (PRINIVIL;ZESTRIL) 20 MG tablet    hydroCHLOROthiazide (HYDRODIURIL) 25 MG tablet         Plan:      - Increase Lisinopril to 20mg qd (from 10mg)  - HTN improved from prior, nearing goal  - Reeducated on importance of adherence  - Letter given for work , x 4 weeks until next follow-up visit        Sloan Lala MD

## 2020-12-02 NOTE — LETTER
[address]  [address]  [address] [facility] [address]  Phone: [phone]  Fax: [phone]    Jack Conroy MD        December 2, 2020     Patient: Christin Mckinney   YOB: 1975   Date of Visit: 12/2/2020       To Whom It May Concern: It is my medical opinion that Ryan Penaloza should remain out of work until 1/2/2021 - returning in 4 weeks to our office for a follow-up of a condition that is in the process of being treated. He was seen at our office today for an appointment with his primary care physician. If you have any questions or concerns, please don't hesitate to call.     Sincerely,        Jack Conroy MD

## 2020-12-29 ENCOUNTER — TELEPHONE (OUTPATIENT)
Dept: FAMILY MEDICINE CLINIC | Age: 45
End: 2020-12-29

## 2021-01-11 ENCOUNTER — OFFICE VISIT (OUTPATIENT)
Dept: FAMILY MEDICINE CLINIC | Age: 46
End: 2021-01-11
Payer: COMMERCIAL

## 2021-01-11 VITALS
SYSTOLIC BLOOD PRESSURE: 158 MMHG | DIASTOLIC BLOOD PRESSURE: 90 MMHG | TEMPERATURE: 99.1 F | HEIGHT: 73 IN | HEART RATE: 108 BPM | WEIGHT: 230 LBS | BODY MASS INDEX: 30.48 KG/M2

## 2021-01-11 DIAGNOSIS — I10 ESSENTIAL HYPERTENSION: Primary | ICD-10-CM

## 2021-01-11 PROCEDURE — 99213 OFFICE O/P EST LOW 20 MIN: CPT | Performed by: STUDENT IN AN ORGANIZED HEALTH CARE EDUCATION/TRAINING PROGRAM

## 2021-01-11 RX ORDER — HYDROCHLOROTHIAZIDE 25 MG/1
25 TABLET ORAL EVERY MORNING
Qty: 90 TABLET | Refills: 1 | Status: SHIPPED | OUTPATIENT
Start: 2021-01-11 | End: 2021-02-24 | Stop reason: SDUPTHER

## 2021-01-11 RX ORDER — METOPROLOL SUCCINATE 50 MG/1
50 TABLET, EXTENDED RELEASE ORAL DAILY
Qty: 30 TABLET | Refills: 5 | Status: SHIPPED | OUTPATIENT
Start: 2021-01-11 | End: 2021-02-24

## 2021-01-11 RX ORDER — LISINOPRIL 20 MG/1
20 TABLET ORAL DAILY
Qty: 30 TABLET | Refills: 2 | Status: SHIPPED | OUTPATIENT
Start: 2021-01-11 | End: 2021-02-24 | Stop reason: SDUPTHER

## 2021-01-11 ASSESSMENT — ENCOUNTER SYMPTOMS
ABDOMINAL DISTENTION: 0
BACK PAIN: 0
WHEEZING: 0
VOMITING: 0
SHORTNESS OF BREATH: 0
ABDOMINAL PAIN: 0
NAUSEA: 0
COUGH: 0
CHEST TIGHTNESS: 0
DIARRHEA: 0

## 2021-01-11 ASSESSMENT — PATIENT HEALTH QUESTIONNAIRE - PHQ9
SUM OF ALL RESPONSES TO PHQ QUESTIONS 1-9: 0
2. FEELING DOWN, DEPRESSED OR HOPELESS: 0
SUM OF ALL RESPONSES TO PHQ9 QUESTIONS 1 & 2: 0
SUM OF ALL RESPONSES TO PHQ QUESTIONS 1-9: 0

## 2021-01-11 NOTE — PATIENT INSTRUCTIONS
Thank you for letting us take care of you today. We hope all your questions were addressed. If a question was overlooked or something else comes to mind after you return home, please contact a member of your Care Team listed below. Your Care Team at [place] [address] is Team #4  Hua Ward MD (Faculty)  Leigha Saldivar MD (Resident)  Ricarda Acosta MD (Resident)  Ariana Jacobson MD (Resident)  Olman Riley MD (Resident)  GRABIEL Guillermo,CARLOS Crowder., [facility] office)  [place] (Clinical Practice Manager)  Radha Briseno, [address] [place] [address] (Clinical Pharmacist)       Office phone number: [phone]    If you need to get in right away due to illness, please be advised we have \"Same Day\" appointments available Monday-Friday. Please call us at [phone] option #3 to schedule your \"Same Day\" appointment.

## 2021-01-11 NOTE — PROGRESS NOTES
Visit Information    Have you changed or started any medications since your last visit including any over-the-counter medicines, vitamins, or herbal medicines? no   Have you stopped taking any of your medications? Is so, why? -  no  Are you having any side effects from any of your medications? - no    Have you seen any other physician or provider since your last visit?  no   Have you had any other diagnostic tests since your last visit?  no   Have you been seen in the emergency room and/or had an admission in a hospital since we last saw you?  no   Have you had your routine dental cleaning in the past 6 months?  no     Do you have an active MyChart account? If no, what is the barrier?   Yes    Patient Care Team:  David Cid MD as PCP - General (Family Medicine)    Medical History Review  Past Medical, Family, and Social History reviewed and does not contribute to the patient presenting condition    Health Maintenance   Topic Date Due    Hepatitis C screen  1975    HIV screen  05/06/1989    DTaP/Tdap/Td vaccine (1 - Tdap) 05/06/1993    Flu vaccine (1) 09/01/2020    Potassium monitoring  08/31/2021    Creatinine monitoring  08/31/2021    Diabetes screen  07/20/2023    Lipid screen  07/20/2025    Hepatitis A vaccine  Aged Out    Hepatitis B vaccine  Aged Out    Hib vaccine  Aged Out    Meningococcal (ACWY) vaccine  Aged Out    Pneumococcal 0-64 years Vaccine  Aged Out

## 2021-01-11 NOTE — LETTER
[address]  [address]  [address] [place] [address]  Phone: [phone]  Fax: [phone]    Real Leos MD        January 11, 2021     Patient: Reji Cox   YOB: 1975   Date of Visit: 1/11/2021       To Whom It May Concern: It is my medical opinion that Vianney Oliver should remain out of work until 2/8/2021, he remains with uncontrolled high blood pressure as per prior letters of this nature. He is to be seen again in 4 weeks from today's visit at our office for the same concern. Furthermore, although the previous letter was until the 1/2/2021 period, actually 1/2/2021 - 1/11/2021 is included in his work exemption due to office scheduling issues. He was also given a referral to a specialist for further workup of his uncontrolled blood pressure. If you have any questions or concerns, please don't hesitate to call.     Sincerely,        Real Leos MD

## 2021-01-11 NOTE — PROGRESS NOTES
I have reviewed and discussed key elements of Harris Lei with the resident including plan of care and follow up and agree with the care vicky plan.

## 2021-01-11 NOTE — PROGRESS NOTES
Fabi Valle (:  1975) is a 54 y.o. male,Established patient, here for evaluation of the following chief complaint(s):  Hypertension (follow up) and Health Maintenance (declined)      ASSESSMENT/PLAN:  1. Essential hypertension  -     AFL([facility]) - Andrews Mitchell MD, Nephrology, [place]  -     metoprolol succinate (TOPROL XL) 50 MG extended release tablet; Take 1 tablet by mouth daily, Disp-30 tablet, R-5Normal  -     lisinopril (PRINIVIL;ZESTRIL) 20 MG tablet; Take 1 tablet by mouth daily, Disp-30 tablet, R-2Normal  -     hydroCHLOROthiazide (HYDRODIURIL) 25 MG tablet; Take 1 tablet by mouth every morning, Disp-90 tablet, R-1Normal    - Attempted different medications for patient over a span of many months without any improvement in control, at this time will give referral for specialist to address HTN with patient, including assessment for any need of further testing, Nephro referral given  - C/w Lisinopril, HCTZ (refills given)  - Add BB (tachycardic today as well) - Metoprolol qd  - Asymptomatic, discussed what s/s to monitor for, educated on real risks of morbidity/mortality of chronic uncontrolled HTN  - Letter given for 4 weeks for work until next follow-up appt     Return in about 4 weeks (around 2021) for f/u uncontrolled HTN. SUBJECTIVE/OBJECTIVE:  Pt presents for f/u uncontrolled HTN  Remains uncontrolled even on repeat reading  Pt denies missing medication doses   Currently on two agents, adding another one (also tachy, will try BB)  Due to nature of difficult control of HTN, discussed that pt should see specialist  Letter requested for work, including dates of 2021 until today, was not able to be scheduled in office       Review of Systems   Constitutional: Negative for activity change, appetite change, chills, fever and unexpected weight change. Respiratory: Negative for cough, chest tightness, shortness of breath and wheezing. Cardiovascular: Negative for chest pain, palpitations and leg swelling. Gastrointestinal: Negative for abdominal distention, abdominal pain, diarrhea, nausea and vomiting. Genitourinary: Negative for difficulty urinating and flank pain. Musculoskeletal: Negative for arthralgias and back pain. Skin: Negative for rash. Neurological: Negative for dizziness, syncope, weakness and light-headedness. Psychiatric/Behavioral: Negative for dysphoric mood. The patient is not nervous/anxious. Physical Exam  Vitals signs reviewed. Constitutional:       General: He is not in acute distress. Appearance: He is well-developed. He is not diaphoretic. HENT:      Head: Normocephalic and atraumatic. Cardiovascular:      Rate and Rhythm: Normal rate and regular rhythm. Heart sounds: Normal heart sounds. No murmur. No gallop. Pulmonary:      Effort: Pulmonary effort is normal. No respiratory distress. Breath sounds: Normal breath sounds. No wheezing or rales. Abdominal:      General: Bowel sounds are normal. There is no distension. Palpations: Abdomen is soft. Tenderness: There is no abdominal tenderness. There is no guarding. Musculoskeletal: Normal range of motion. General: No tenderness. Skin:     General: Skin is warm and dry. Findings: No rash. Neurological:      Mental Status: He is alert and oriented to person, place, and time. Sensory: No sensory deficit.    Psychiatric:         Judgment: Judgment normal.       Vitals:    01/11/21 1453 01/11/21 1506   BP: (!) 157/101 (!) 158/90   Site: Left Upper Arm Right Upper Arm   Position: Sitting Sitting   Cuff Size: Medium Adult Large Adult   Pulse: 108    Temp: 99.1 °F (37.3 °C)    TempSrc: Temporal    Weight: 230 lb (104.3 kg)    Height: 6' 1\" (1.854 m) On this date 01/11/21 I have spent 15 minutes reviewing previous notes, test results and face to face with the patient discussing the diagnosis and importance of compliance with the treatment plan as well as documenting on the day of the visit. An electronic signature was used to authenticate this note.     --Deloris Barajas MD

## 2021-01-28 ENCOUNTER — TELEPHONE (OUTPATIENT)
Dept: FAMILY MEDICINE CLINIC | Age: 46
End: 2021-01-28

## 2021-01-28 NOTE — TELEPHONE ENCOUNTER
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 fax:[phone] ATTN [place] Shay. He would also like to  a copy. Please contact him when this is available.

## 2021-02-11 DIAGNOSIS — I10 ESSENTIAL HYPERTENSION: ICD-10-CM

## 2021-02-11 RX ORDER — AMLODIPINE BESYLATE 10 MG/1
TABLET ORAL
Qty: 30 TABLET | Refills: 1 | Status: SHIPPED | OUTPATIENT
Start: 2021-02-11 | End: 2021-02-24 | Stop reason: SDUPTHER

## 2021-02-11 NOTE — TELEPHONE ENCOUNTER
norvasc pending for refill     Health Maintenance   Topic Date Due    Hepatitis C screen  1975    HIV screen  05/06/1989    DTaP/Tdap/Td vaccine (1 - Tdap) 05/06/1993    Flu vaccine (1) 09/01/2020    Potassium monitoring  08/31/2021    Creatinine monitoring  08/31/2021    Diabetes screen  07/20/2023    Lipid screen  07/20/2025    Hepatitis A vaccine  Aged Out    Hepatitis B vaccine  Aged Out    Hib vaccine  Aged Out    Meningococcal (ACWY) vaccine  Aged Out    Pneumococcal 0-64 years Vaccine  Aged Out             (applicable per patient's age: Cancer Screenings, Depression Screening, Fall Risk Screening, Immunizations)    Hemoglobin A1C (%)   Date Value   07/20/2020 5.5     LDL Cholesterol (mg/dL)   Date Value   07/20/2020 122     BUN (mg/dL)   Date Value   08/31/2020 14      (goal A1C is < 7)   (goal LDL is <100) need 30-50% reduction from baseline     BP Readings from Last 3 Encounters:   01/28/21 124/88   01/11/21 (!) 158/90   12/02/20 (!) 132/98    (goal /80)      All Future Testing planned in [facility]:  Lab Frequency Next Occurrence   CBC Auto Differential Once 01/28/2021   Comprehensive Metabolic Panel Once 22/74/0601   BOUCHRA Screen with Reflex Once 01/28/2021   Aldosterone Once 01/28/2021   Renin Once 01/28/2021   Creatinine, Random Urine Once 04/28/2021   Sodium, urine, random Once 01/28/2021   Protein, urine, random Once 04/28/2021   Protein / creatinine ratio, urine Once 01/28/2021   Urinalysis with Microscopic Once 04/28/2021   Potassium, urine, random Once 01/28/2021   Chloride, Random Urine Once 01/28/2021   US RENAL COMPLETE Once 01/28/2021   Limited 2D Echo w Doppler w Color Flow Once 01/28/2021       Next Visit Date:  Future Appointments   Date Time Provider Isabel Rubin   2/24/2021  1:30 PM Deloris Barajas MD [facility] [place]   3/25/2021 10:10 AM Carl Bowen MD [facility] None            Patient Active Problem List:     Essential hypertension     Family history of diabetes mellitus

## 2021-02-17 NOTE — TELEPHONE ENCOUNTER
Letter faxed to [phone] as requested by patient. He also wanted a copy of the letter, so copy was scanned into media and left at temp station for patient to .

## 2021-02-24 ENCOUNTER — OFFICE VISIT (OUTPATIENT)
Dept: FAMILY MEDICINE CLINIC | Age: 46
End: 2021-02-24
Payer: COMMERCIAL

## 2021-02-24 VITALS
HEART RATE: 78 BPM | DIASTOLIC BLOOD PRESSURE: 86 MMHG | WEIGHT: 232 LBS | TEMPERATURE: 98.2 F | HEIGHT: 73 IN | BODY MASS INDEX: 30.75 KG/M2 | SYSTOLIC BLOOD PRESSURE: 137 MMHG

## 2021-02-24 DIAGNOSIS — I10 ESSENTIAL HYPERTENSION: Primary | ICD-10-CM

## 2021-02-24 PROCEDURE — 99213 OFFICE O/P EST LOW 20 MIN: CPT | Performed by: STUDENT IN AN ORGANIZED HEALTH CARE EDUCATION/TRAINING PROGRAM

## 2021-02-24 RX ORDER — LISINOPRIL 20 MG/1
20 TABLET ORAL DAILY
Qty: 30 TABLET | Refills: 2 | Status: SHIPPED | OUTPATIENT
Start: 2021-02-24

## 2021-02-24 RX ORDER — HYDROCHLOROTHIAZIDE 25 MG/1
25 TABLET ORAL EVERY MORNING
Qty: 90 TABLET | Refills: 1 | Status: SHIPPED | OUTPATIENT
Start: 2021-02-24

## 2021-02-24 RX ORDER — AMLODIPINE BESYLATE 10 MG/1
TABLET ORAL
Qty: 30 TABLET | Refills: 1 | Status: SHIPPED | OUTPATIENT
Start: 2021-02-24

## 2021-02-24 ASSESSMENT — ENCOUNTER SYMPTOMS
ABDOMINAL PAIN: 0
SHORTNESS OF BREATH: 0
ABDOMINAL DISTENTION: 0
DIARRHEA: 0
VOMITING: 0
CHEST TIGHTNESS: 0
BACK PAIN: 0
WHEEZING: 0
COUGH: 0
NAUSEA: 0

## 2021-02-24 NOTE — LETTER
[address]  [place] [address]  Phone: [phone]  Fax: [phone]    Keven Cruz MD        February 24, 2021     Patient: Tigre Méndez   YOB: 1975   Date of Visit: 2/24/2021       To Whom It May Concern: It is my medical opinion that Miguelito Crenshaw may return to work on March 2, 2021 without restrictions. Patient was seen at our office and his condition is now controlled. .    If you have any questions or concerns, please don't hesitate to call.     Sincerely,        Keven Cruz MD

## 2021-02-24 NOTE — PATIENT INSTRUCTIONS
Thank you for letting us take care of you today. We hope all your questions were addressed. If a question was overlooked or something else comes to mind after you return home, please contact a member of your Care Team listed below. Your Care Team at [place] [address] is Team #4  Kalyan Ann MD (Faculty)  Noemy Iniguez MD (Resident)  Cisco Dickinson MD (Resident)  Alexandria Miles MD (Resident)  Michael Abbasi MD (Resident)  Irasema MOLINA,CARLOS Turcios., CARLOS Dhaliwal. VALORIE  [facility] ([address] office)  Sadi Black, [facility] (Clinical Practice Manager)  Sue Campoverde, [address] [place] [address] (Clinical Pharmacist)       Office phone number: [phone]    If you need to get in right away due to illness, please be advised we have \"Same Day\" appointments available Monday-Friday. Please call us at [phone] option #3 to schedule your \"Same Day\" appointment.

## 2021-02-24 NOTE — PROGRESS NOTES
HYPERTENSION visit     BP Readings from Last 3 Encounters:   01/28/21 124/88   01/11/21 (!) 158/90   12/02/20 (!) 132/98       LDL Cholesterol (mg/dL)   Date Value   07/20/2020 122     HDL (mg/dL)   Date Value   07/20/2020 43     BUN (mg/dL)   Date Value   08/31/2020 14     CREATININE (mg/dL)   Date Value   08/31/2020 1.18     Glucose (mg/dL)   Date Value   08/31/2020 127 (H)              Have you changed or started any medications since your last visit including any over-the-counter medicines, vitamins, or herbal medicines? yes - Metoprolol   Have you stopped taking any of your medications? Is so, why? -  yes - Metoprolol  Are you having any side effects from any of your medications? - yes - same  How often do you miss doses of your medication? no      Have you seen any other physician or provider since your last visit?  no   Have you had any other diagnostic tests since your last visit?  no   Have you been seen in the emergency room and/or had an admission in a hospital since we last saw you?  no   Have you had your routine dental cleaning in the past 6 months?  no     Do you have an active MyChart account? If no, what is the barrier?   Yes    Patient Care Team:  Real Leos MD as PCP - General (Family Medicine)    Medical History Review  Past Medical, Family, and Social History reviewed and does not contribute to the patient presenting condition    Health Maintenance   Topic Date Due    Hepatitis C screen  1975    HIV screen  05/06/1989    DTaP/Tdap/Td vaccine (1 - Tdap) 05/06/1993    Flu vaccine (1) 09/01/2020    Potassium monitoring  08/31/2021    Creatinine monitoring  08/31/2021    Diabetes screen  07/20/2023    Lipid screen  07/20/2025    Hepatitis A vaccine  Aged Out    Hepatitis B vaccine  Aged Out    Hib vaccine  Aged Out    Meningococcal (ACWY) vaccine  Aged Out    Pneumococcal 0-64 years Vaccine  Aged Out

## 2021-02-24 NOTE — PROGRESS NOTES
Orlin Skiff (:  1975) is a 54 y.o. male,Established patient, here for evaluation of the following chief complaint(s):  Hypertension (BP Check) and Health Maintenance (declined)      ASSESSMENT/PLAN:  1. Essential hypertension  -     lisinopril (PRINIVIL;ZESTRIL) 20 MG tablet; Take 1 tablet by mouth daily, Disp-30 tablet, R-2Normal  -     hydroCHLOROthiazide (HYDRODIURIL) 25 MG tablet; Take 1 tablet by mouth every morning, Disp-90 tablet, R-1Normal  -     amLODIPine (NORVASC) 10 MG tablet; take 1 tablet by mouth once daily, Disp-30 tablet, R-1Normal    - HTN now controlled, on 3 agents, pt stopped BB 2/2 AE  - Reviewed Nephro notes with patient and recommendations, agree with all, pending ECHO and renal U/S and labs, avoid NSAIDs    Return in about 6 months (around 2021) for f/u htn, annual.    SUBJECTIVE/OBJECTIVE:  Pt presents for f/u HTN  Pt finally controlled, now on 3 agents, stopped BB due to fatigue and other adverse effects  Denies any adverse effects on current meds, refills given  Pt denies s/s including CP/SOB/HA/abdo pain/vision changes  Pt followed w/ Nephro per referral from last visit, reviewed work-up pending  Return to work letter given      Review of Systems   Constitutional: Negative for activity change, appetite change, chills, fever and unexpected weight change. Respiratory: Negative for cough, chest tightness, shortness of breath and wheezing. Cardiovascular: Negative for chest pain, palpitations and leg swelling. Gastrointestinal: Negative for abdominal distention, abdominal pain, diarrhea, nausea and vomiting. Genitourinary: Negative for difficulty urinating and flank pain. Musculoskeletal: Negative for arthralgias and back pain. Skin: Negative for rash. Neurological: Negative for dizziness, syncope, weakness and light-headedness. Psychiatric/Behavioral: Negative for dysphoric mood. The patient is not nervous/anxious.         Physical Exam Vitals signs reviewed. Constitutional:       General: He is not in acute distress. Appearance: He is well-developed. He is not diaphoretic. HENT:      Head: Normocephalic and atraumatic. Cardiovascular:      Rate and Rhythm: Normal rate and regular rhythm. Heart sounds: Normal heart sounds. No murmur. No gallop. Pulmonary:      Effort: Pulmonary effort is normal. No respiratory distress. Breath sounds: Normal breath sounds. No wheezing or rales. Abdominal:      General: Bowel sounds are normal. There is no distension. Palpations: Abdomen is soft. Tenderness: There is no abdominal tenderness. There is no guarding. Musculoskeletal: Normal range of motion. General: No tenderness. Skin:     General: Skin is warm and dry. Findings: No rash. Neurological:      Mental Status: He is alert and oriented to person, place, and time. Sensory: No sensory deficit. Psychiatric:         Judgment: Judgment normal.       Vitals:    02/24/21 1334   BP: 137/86   Site: Left Upper Arm   Position: Sitting   Cuff Size: Medium Adult   Pulse: 78   Temp: 98.2 °F (36.8 °C)   TempSrc: Temporal   Weight: 232 lb (105.2 kg)   Height: 6' 1\" (1.854 m)         On this date 02/24/21 I have spent 21 minutes reviewing previous notes, test results and face to face with the patient discussing the diagnosis and importance of compliance with the treatment plan as well as documenting on the day of the visit. An electronic signature was used to authenticate this note.     --Lisa Gilmore MD

## 2022-04-27 ENCOUNTER — APPOINTMENT (OUTPATIENT)
Dept: CT IMAGING | Age: 47
End: 2022-04-27
Payer: MEDICAID

## 2022-04-27 ENCOUNTER — HOSPITAL ENCOUNTER (EMERGENCY)
Age: 47
Discharge: HOME | End: 2022-04-28
Attending: EMERGENCY MEDICINE
Payer: MEDICAID

## 2022-04-27 ENCOUNTER — APPOINTMENT (OUTPATIENT)
Dept: GENERAL RADIOLOGY | Age: 47
End: 2022-04-27
Payer: MEDICAID

## 2022-04-27 VITALS
OXYGEN SATURATION: 98 % | RESPIRATION RATE: 19 BRPM | SYSTOLIC BLOOD PRESSURE: 149 MMHG | TEMPERATURE: 98.1 F | BODY MASS INDEX: 29.69 KG/M2 | WEIGHT: 224 LBS | DIASTOLIC BLOOD PRESSURE: 104 MMHG | HEIGHT: 73 IN | HEART RATE: 80 BPM

## 2022-04-27 DIAGNOSIS — R42 DIZZINESS: Primary | ICD-10-CM

## 2022-04-27 DIAGNOSIS — S62.646A CLOSED NONDISPLACED FRACTURE OF PROXIMAL PHALANX OF RIGHT LITTLE FINGER, INITIAL ENCOUNTER: ICD-10-CM

## 2022-04-27 DIAGNOSIS — I10 HYPERTENSION, UNSPECIFIED TYPE: ICD-10-CM

## 2022-04-27 DIAGNOSIS — N17.9 AKI (ACUTE KIDNEY INJURY) (HCC): ICD-10-CM

## 2022-04-27 LAB
ABSOLUTE EOS #: <0.03 K/UL (ref 0–0.44)
ABSOLUTE IMMATURE GRANULOCYTE: 0.03 K/UL (ref 0–0.3)
ABSOLUTE LYMPH #: 0.82 K/UL (ref 1.1–3.7)
ABSOLUTE MONO #: 0.45 K/UL (ref 0.1–1.2)
ANION GAP SERPL CALCULATED.3IONS-SCNC: 9 MMOL/L (ref 9–17)
BASOPHILS # BLD: 0 % (ref 0–2)
BASOPHILS ABSOLUTE: 0.03 K/UL (ref 0–0.2)
BUN BLDV-MCNC: 16 MG/DL (ref 6–20)
BUN/CREAT BLD: 12 (ref 9–20)
CALCIUM SERPL-MCNC: 9.2 MG/DL (ref 8.6–10.4)
CHLORIDE BLD-SCNC: 104 MMOL/L (ref 98–107)
CO2: 25 MMOL/L (ref 20–31)
CREAT SERPL-MCNC: 1.3 MG/DL (ref 0.7–1.2)
EOSINOPHILS RELATIVE PERCENT: 0 % (ref 1–4)
GFR AFRICAN AMERICAN: >60 ML/MIN
GFR NON-AFRICAN AMERICAN: 59 ML/MIN
GFR SERPL CREATININE-BSD FRML MDRD: ABNORMAL ML/MIN/{1.73_M2}
GLUCOSE BLD-MCNC: 108 MG/DL (ref 70–99)
HCT VFR BLD CALC: 41.4 % (ref 40.7–50.3)
HEMOGLOBIN: 13.8 G/DL (ref 13–17)
IMMATURE GRANULOCYTES: 0 %
LYMPHOCYTES # BLD: 8 % (ref 24–43)
MCH RBC QN AUTO: 32.6 PG (ref 25.2–33.5)
MCHC RBC AUTO-ENTMCNC: 33.3 G/DL (ref 28.4–34.8)
MCV RBC AUTO: 97.9 FL (ref 82.6–102.9)
MONOCYTES # BLD: 4 % (ref 3–12)
NRBC AUTOMATED: 0 PER 100 WBC
PDW BLD-RTO: 11.9 % (ref 11.8–14.4)
PLATELET # BLD: 145 K/UL (ref 138–453)
PMV BLD AUTO: 10.9 FL (ref 8.1–13.5)
POTASSIUM SERPL-SCNC: 3.6 MMOL/L (ref 3.7–5.3)
RBC # BLD: 4.23 M/UL (ref 4.21–5.77)
SEG NEUTROPHILS: 87 % (ref 36–65)
SEGMENTED NEUTROPHILS ABSOLUTE COUNT: 8.88 K/UL (ref 1.5–8.1)
SODIUM BLD-SCNC: 138 MMOL/L (ref 135–144)
TROPONIN, HIGH SENSITIVITY: 18 NG/L (ref 0–22)
TSH SERPL DL<=0.05 MIU/L-ACNC: 1.27 UIU/ML (ref 0.3–5)
WBC # BLD: 10.2 K/UL (ref 3.5–11.3)

## 2022-04-27 PROCEDURE — 93005 ELECTROCARDIOGRAM TRACING: CPT | Performed by: EMERGENCY MEDICINE

## 2022-04-27 PROCEDURE — 80048 BASIC METABOLIC PNL TOTAL CA: CPT

## 2022-04-27 PROCEDURE — 70450 CT HEAD/BRAIN W/O DYE: CPT

## 2022-04-27 PROCEDURE — 84484 ASSAY OF TROPONIN QUANT: CPT

## 2022-04-27 PROCEDURE — 85025 COMPLETE CBC W/AUTO DIFF WBC: CPT

## 2022-04-27 PROCEDURE — 84443 ASSAY THYROID STIM HORMONE: CPT

## 2022-04-27 PROCEDURE — 99285 EMERGENCY DEPT VISIT HI MDM: CPT

## 2022-04-27 PROCEDURE — 2580000003 HC RX 258: Performed by: EMERGENCY MEDICINE

## 2022-04-27 PROCEDURE — 70498 CT ANGIOGRAPHY NECK: CPT

## 2022-04-27 PROCEDURE — 6360000004 HC RX CONTRAST MEDICATION: Performed by: EMERGENCY MEDICINE

## 2022-04-27 PROCEDURE — 71045 X-RAY EXAM CHEST 1 VIEW: CPT

## 2022-04-27 RX ORDER — 0.9 % SODIUM CHLORIDE 0.9 %
80 INTRAVENOUS SOLUTION INTRAVENOUS ONCE
Status: COMPLETED | OUTPATIENT
Start: 2022-04-27 | End: 2022-04-27

## 2022-04-27 RX ORDER — SODIUM CHLORIDE 0.9 % (FLUSH) 0.9 %
10 SYRINGE (ML) INJECTION PRN
Status: DISCONTINUED | OUTPATIENT
Start: 2022-04-27 | End: 2022-04-28 | Stop reason: HOSPADM

## 2022-04-27 RX ADMIN — IOPAMIDOL 75 ML: 755 INJECTION, SOLUTION INTRAVENOUS at 23:25

## 2022-04-27 RX ADMIN — SODIUM CHLORIDE 80 ML: 9 INJECTION, SOLUTION INTRAVENOUS at 23:26

## 2022-04-27 RX ADMIN — SODIUM CHLORIDE, PRESERVATIVE FREE 10 ML: 5 INJECTION INTRAVENOUS at 23:25

## 2022-04-27 ASSESSMENT — ENCOUNTER SYMPTOMS
TROUBLE SWALLOWING: 0
PHOTOPHOBIA: 0
VOMITING: 0
NAUSEA: 0
SHORTNESS OF BREATH: 0
VOICE CHANGE: 0

## 2022-04-28 ENCOUNTER — APPOINTMENT (OUTPATIENT)
Dept: GENERAL RADIOLOGY | Age: 47
End: 2022-04-28
Payer: MEDICAID

## 2022-04-28 LAB
EKG ATRIAL RATE: 72 BPM
EKG P AXIS: 60 DEGREES
EKG P-R INTERVAL: 142 MS
EKG Q-T INTERVAL: 382 MS
EKG QRS DURATION: 92 MS
EKG QTC CALCULATION (BAZETT): 418 MS
EKG R AXIS: 23 DEGREES
EKG T AXIS: 33 DEGREES
EKG VENTRICULAR RATE: 72 BPM
TROPONIN, HIGH SENSITIVITY: 20 NG/L (ref 0–22)

## 2022-04-28 PROCEDURE — 73130 X-RAY EXAM OF HAND: CPT

## 2022-04-28 RX ORDER — TRAMADOL HYDROCHLORIDE 50 MG/1
50 TABLET ORAL EVERY 4 HOURS PRN
Qty: 18 TABLET | Refills: 0 | Status: SHIPPED | OUTPATIENT
Start: 2022-04-28 | End: 2022-05-01

## 2022-04-28 RX ORDER — ACETAMINOPHEN 500 MG
1000 TABLET ORAL EVERY 8 HOURS PRN
Qty: 30 TABLET | Refills: 0 | Status: SHIPPED | OUTPATIENT
Start: 2022-04-28

## 2022-04-28 RX ORDER — NAPROXEN 500 MG/1
500 TABLET ORAL 2 TIMES DAILY PRN
Qty: 20 TABLET | Refills: 0 | Status: SHIPPED | OUTPATIENT
Start: 2022-04-28

## 2022-04-28 NOTE — ED NOTES
Report given to KATHERYN Astudillo. All questions answered at this time.      Deric Triana RN  04/28/22 6999 normal (ped)...

## 2022-04-28 NOTE — ED PROVIDER NOTES
[facility]  Emergency Department Encounter     Pt Name: Herminia Carrillo  MRN: [id number]  [place] 1975  Date of evaluation: 4/27/22  PCP:  Aleksander Romero MD    CHIEF COMPLAINT       Chief Complaint   Patient presents with    Dizziness     Pt states that he was driving a couple of hours PTa when he developed dizziness and blurred vision while staring into the sun. Pt reports improved blurred vision since then     Blurred Vision       HISTORY OF PRESENT ILLNESS  (Location/Symptom, Timing/Onset, Context/Setting, Quality, Duration, Modifying Factors, Severity.)    Herminia Carrillo is a 51 y.o. male who presents with an episode of dizziness, blurred vision when he was driving earlier today. Patient states that he was driving into the [facility] and believes that the bright light from his son may have made him feel unwell. When he got to his friend's house he kind of felt out of body like experience and his wife came to pick him up to bring him to the emergency department to be evaluated. He does admit to a history of high blood pressure and also admits to noncompliance with his blood pressure medications was wondering if the blood pressure was contributing to his symptoms. He is not having any chest pain shortness of breath or palpitations. No numbness tingling or weakness. No recent falls or head trauma. No history of prior stroke. Is not on any blood thinners. He states currently in the emergency department he is feeling better. PAST MEDICAL / SURGICAL / SOCIAL / FAMILY HISTORY    has a past medical history of Hypertension. has a past surgical history that includes Inguinal hernia repair and Hand tendon surgery (Right).     Social History     Socioeconomic History    Marital status: Single     Spouse name: Not on file    Number of children: Not on file    Years of education: Not on file    Highest education level: Not on file   Occupational History    Not on file   Tobacco Use    Smoking status: Never Smoker    Smokeless tobacco: Never Used   Vaping Use    Vaping Use: Never used   Substance and Sexual Activity    Alcohol use: Yes     Comment: occassionally    Drug use: No    Sexual activity: Not on file   Other Topics Concern    Not on file   Social History Narrative    Not on file     Social Determinants of Health     Financial Resource Strain:     Difficulty of Paying Living Expenses: Not on file   Food Insecurity:     Worried About Running Out of Food in the Last Year: Not on file    Yuliet of Food in the Last Year: Not on file   Transportation Needs:     Lack of Transportation (Medical): Not on file    Lack of Transportation (Non-Medical): Not on file   Physical Activity:     Days of Exercise per Week: Not on file    Minutes of Exercise per Session: Not on file   Stress:     Feeling of Stress : Not on file   Social Connections:     Frequency of Communication with Friends and Family: Not on file    Frequency of Social Gatherings with Friends and Family: Not on file    Attends Scientologist Services: Not on file    Active Member of [address] [place] [address] or Organizations: Not on file    Attends Club or Organization Meetings: Not on file    Marital Status: Not on file   Intimate Partner Violence:     Fear of Current or Ex-Partner: Not on file    Emotionally Abused: Not on file    Physically Abused: Not on file    Sexually Abused: Not on file   Housing Stability:     Unable to Pay for Housing in the Last Year: Not on file    Number of [place] in the Last Year: Not on file    Unstable Housing in the Last Year: Not on file       Family History   Problem Relation Age of Onset    Hypertension Mother     Diabetes Maternal Grandmother     Anemia Maternal Cousin         sickle cell       Allergies:    Patient has no known allergies. Home Medications:  Prior to Admission medications    Medication Sig Start Date End Date Taking?  Authorizing Provider traMADol (ULTRAM) 50 MG tablet Take 1 tablet by mouth every 4 hours as needed for Pain for up to 3 days. Intended supply: 3 days. Take lowest dose possible to manage pain 4/28/22 5/1/22 Yes Lida [place], DO   naproxen (NAPROSYN) 500 MG tablet Take 1 tablet by mouth 2 times daily as needed for Pain 4/28/22  Yes Lida [place], DO   acetaminophen (TYLENOL) 500 MG tablet Take 2 tablets by mouth every 8 hours as needed for Pain 4/28/22  Yes Lida [place], DO   lisinopril (PRINIVIL;ZESTRIL) 20 MG tablet Take 1 tablet by mouth daily 2/24/21   Greg Sanabria MD   hydroCHLOROthiazide (HYDRODIURIL) 25 MG tablet Take 1 tablet by mouth every morning 2/24/21   Greg Sanabria MD   amLODIPine (NORVASC) 10 MG tablet take 1 tablet by mouth once daily 2/24/21   Greg Sanabria MD       REVIEW OF SYSTEMS    (2-9 systems for level 4, 10 or more for level 5)    Review of Systems   HENT: Negative for tinnitus, trouble swallowing and voice change. Eyes: Positive for visual disturbance. Negative for photophobia. Respiratory: Negative for shortness of breath. Cardiovascular: Negative for chest pain and palpitations. Gastrointestinal: Negative for nausea and vomiting. Musculoskeletal: Positive for myalgias. Negative for neck pain. Neurological: Positive for dizziness and headaches. Negative for syncope, facial asymmetry, speech difficulty, weakness, light-headedness and numbness. Hematological: Does not bruise/bleed easily. Psychiatric/Behavioral: Negative for confusion. PHYSICAL EXAM   (up to 7 for level 4, 8 or more for level 5)    VITALS:   Vitals:    04/27/22 2207   BP: (!) 149/104   Pulse: 80   Resp: 19   Temp: 98.1 °F (36.7 °C)   SpO2: 98%   Weight: 224 lb (101.6 kg)   Height: 6' 1\" (1.854 m)       Physical Exam  Vitals and nursing note reviewed. Constitutional:       General: He is not in acute distress. Appearance: He is well-developed. He is obese. He is not diaphoretic.    HENT: Head: Normocephalic and atraumatic. Eyes:      Extraocular Movements: Extraocular movements intact. Conjunctiva/sclera: Conjunctivae normal.      Pupils: Pupils are equal, round, and reactive to light. Cardiovascular:      Rate and Rhythm: Normal rate and regular rhythm. Heart sounds: Normal heart sounds. No murmur heard. Pulmonary:      Effort: Pulmonary effort is normal. No respiratory distress. Breath sounds: Normal breath sounds. No wheezing, rhonchi or rales. Abdominal:      General: There is no distension. Palpations: Abdomen is soft. Tenderness: There is no abdominal tenderness. Musculoskeletal:         General: Swelling, tenderness and signs of injury present. No deformity. Right hand: Swelling and tenderness present. No deformity or bony tenderness. Decreased range of motion. Normal strength. Normal sensation. There is no disruption of two-point discrimination. Normal capillary refill. Normal pulse. Cervical back: Normal range of motion. Right lower leg: No edema. Left lower leg: No edema. Comments: R 5th digit swollen and tender over proximal phalanx with decreased ROM secondary to pain   Skin:     General: Skin is warm and dry. Findings: No rash. Neurological:      General: No focal deficit present. Mental Status: He is alert and oriented to person, place, and time. GCS: GCS eye subscore is 4. GCS verbal subscore is 5. GCS motor subscore is 6. Cranial Nerves: Cranial nerves are intact. Sensory: Sensation is intact. Motor: Motor function is intact. Coordination: Coordination is intact. Gait: Gait is intact.       Comments: NIH 0   Psychiatric:         Behavior: Behavior normal.         DIFFERENTIAL  DIAGNOSIS   PLAN (LABS / IMAGING / EKG):  Orders Placed This Encounter   Procedures    XR CHEST 1 VIEW    CT HEAD WO CONTRAST    CTA HEAD NECK W CONTRAST    XR HAND RIGHT (MIN 3 VIEWS)    CBC with Auto Differential    Basic Metabolic Panel    Troponin    TSH w/reflex to FT4    Troponin    Application finger splint static    EKG 12 Lead    Insert peripheral IV       MEDICATIONS ORDERED:  Orders Placed This Encounter   Medications    0.9 % sodium chloride bolus    sodium chloride flush 0.9 % injection 10 mL    iopamidol (ISOVUE-370) 76 % injection 75 mL    traMADol (ULTRAM) 50 MG tablet     Sig: Take 1 tablet by mouth every 4 hours as needed for Pain for up to 3 days. Intended supply: 3 days. Take lowest dose possible to manage pain     Dispense:  18 tablet     Refill:  0    naproxen (NAPROSYN) 500 MG tablet     Sig: Take 1 tablet by mouth 2 times daily as needed for Pain     Dispense:  20 tablet     Refill:  0    acetaminophen (TYLENOL) 500 MG tablet     Sig: Take 2 tablets by mouth every 8 hours as needed for Pain     Dispense:  30 tablet     Refill:  0     DIAGNOSTIC RESULTS / EMERGENCYDEPARTMENT COURSE / MDM   LABS:  Labs Reviewed   CBC WITH AUTO DIFFERENTIAL - Abnormal; Notable for the following components:       Result Value    Seg Neutrophils 87 (*)     Lymphocytes 8 (*)     Eosinophils % 0 (*)     Segs Absolute 8.88 (*)     Absolute Lymph # 0.82 (*)     All other components within normal limits   BASIC METABOLIC PANEL - Abnormal; Notable for the following components:    Glucose 108 (*)     CREATININE 1.30 (*)     Potassium 3.6 (*)     GFR Non- 59 (*)     All other components within normal limits   TROPONIN   TSH WITH REFLEX   TROPONIN       RADIOLOGY:  CT HEAD WO CONTRAST    Result Date: 4/27/2022  EXAMINATION: CT OF THE HEAD WITHOUT CONTRAST  4/27/2022 11:23 pm TECHNIQUE: CT of the head was performed without the administration of intravenous contrast. Dose modulation, iterative reconstruction, and/or weight based adjustment of the mA/kV was utilized to reduce the radiation dose to as low as reasonably achievable.  COMPARISON: MR brain January 14, 2010 HISTORY: ORDERING SYSTEM PROVIDED HISTORY: dizziness blurry vision TECHNOLOGIST PROVIDED HISTORY: dizziness blurry vision Decision Support Exception - unselect if not a suspected or confirmed emergency medical condition->Emergency Medical Condition (MA) Reason for Exam: dizziness and blurry vision sudden onset today while driving and lasted for a few minutes that resided. FINDINGS: BRAIN/VENTRICLES: There is no acute intracranial hemorrhage, mass effect or midline shift. No abnormal extra-axial fluid collection. The gray-white differentiation is maintained without evidence of an acute infarct. There is no evidence of hydrocephalus. ORBITS: The visualized portion of the orbits demonstrate no acute abnormality. SINUSES: The visualized paranasal sinuses and mastoid air cells demonstrate no acute abnormality. SOFT TISSUES/SKULL:  No acute abnormality of the visualized skull or soft tissues. No acute intracranial abnormality. XR CHEST 1 VIEW    Result Date: 4/27/2022  EXAMINATION: ONE XRAY VIEW OF THE CHEST 4/27/2022 10:36 pm COMPARISON: None. HISTORY: ORDERING SYSTEM PROVIDED HISTORY: dizziness TECHNOLOGIST PROVIDED HISTORY: dizziness Reason for Exam: dizziness, blurred vision FINDINGS: The lungs are without acute focal process. There is no effusion or pneumothorax. The cardiomediastinal silhouette is without acute process. The osseous structures are without acute process. No acute process. CTA HEAD NECK W CONTRAST    Result Date: 4/28/2022  EXAMINATION: CTA OF THE HEAD AND NECK WITH CONTRAST 4/27/2022 11:25 pm: TECHNIQUE: CTA of the head and neck was performed with the administration of intravenous contrast. Multiplanar reformatted images are provided for review. MIP images are provided for review. Stenosis of the internal carotid arteries measured using NASCET criteria.  Dose modulation, iterative reconstruction, and/or weight based adjustment of the mA/kV was utilized to reduce the radiation dose to as low as reasonably achievable. COMPARISON: None. HISTORY: ORDERING SYSTEM PROVIDED HISTORY: dizziness blurred vision TECHNOLOGIST PROVIDED HISTORY: dizziness blurred vision Decision Support Exception - unselect if not a suspected or confirmed emergency medical condition->Emergency Medical Condition (MA) Reason for Exam: dizziness and blurry vision sudden onset today while driving and lasted for a few minutes that resided. Relevant Medical/Surgical History: hx of HTN FINDINGS: CTA NECK: AORTIC ARCH/ARCH VESSELS: No dissection or arterial injury. No significant stenosis of the brachiocephalic or subclavian arteries. CAROTID ARTERIES: No dissection, arterial injury, or hemodynamically significant stenosis by NASCET criteria. VERTEBRAL ARTERIES: No dissection, arterial injury, or significant stenosis. SOFT TISSUES: The lung apices are clear. No cervical or superior mediastinal lymphadenopathy. The larynx and pharynx are unremarkable. No acute abnormality of the salivary and thyroid glands. BONES: No acute osseous abnormality. There is a probable old C7 spinous process fracture CTA HEAD: ANTERIOR CIRCULATION: No significant stenosis of the intracranial internal carotid, anterior cerebral, or middle cerebral arteries. No aneurysm. POSTERIOR CIRCULATION: No significant stenosis of the vertebral, basilar, or posterior cerebral arteries. No aneurysm. OTHER: No dural venous sinus thrombosis on this non-dedicated study. BRAIN: No mass effect or midline shift. No extra-axial fluid collection. The gray-white differentiation is maintained. Severe left maxillary sinus disease is identified. Both maxillary sinuses are hypoplastic. Unremarkable CTA of the head and neck.        EKG    EKG Interpretation    Interpreted by emergency department physician    Rhythm: normal sinus   Rate: normal  Axis: normal  Ectopy: none  Conduction: normal  ST Segments: no acute change  T Waves: no acute change  Q Waves: none    Clinical Impression: non-specific EKG    All EKG's are interpreted by the Emergency Department Physician whoeither signs or Co-signs this chart in the absence of a cardiologist.    EMERGENCY DEPARTMENT COURSE:  ED Course as of 04/28/22 0051 Wed Apr 27, 2022 2308 CBC with Auto Differential(!):    WBC 10.2   RBC 4.23   Hemoglobin Quant 13.8   Hematocrit 41.4   MCV 97.9   MCH 32.6   MCHC 33.3   RDW 11.9   Platelet Count 322   MPV 10.9   NRBC Automated 0.0   Seg Neutrophils 87(!)   Lymphocytes 8(!)   Monocytes 4   Eosinophils % 0(!)   Basophils 0   Immature Granulocytes 0   Segs Absolute 8.88(!)   Absolute Lymph # 0.82(!)   Absolute Mono # 0.45   Absolute Eos # <0.03   Basophils Absolute 0.03   Absolute Immature Granulocyte 0.03 [AO]   2308 XR CHEST 1 VIEW [AO]   9612 Basic Metabolic Panel(!):    GLUCOSE, FASTING,(!)   BUN,BUNPL 16   Creatinine 1.30(!)   Bun/Cre Ratio 12   CALCIUM, SERUM, 657768 9.2   Sodium 138   Potassium 3.6(!)   Chloride 104   CO2 25   Anion Gap 9   GFR Non- 59(!)   GFR  >60   GFR Comment      [AO]   2325 Troponin, High Sensitivity: 18 [AO]   2325 TSH: 1.27 [AO]   Thu Apr 28, 2022   0007 CT HEAD WO CONTRAST [AO]   0007 CTA HEAD NECK W CONTRAST [AO]   0018 Troponin, High Sensitivity: 20 [AO]   0021 When I went to update patient he started complaining of right pinky pain and swelling. Does not recall injuring it today, however he states it feels swollen and difficult to bend.   There is some swelling to the MCP to PIP area with decreased flexion, however distally neurovascular intact [AO]      ED Course User Index  [AO] Timo Kirill Carranza 1721, DO       MDM  Number of Diagnoses or Management Options     Amount and/or Complexity of Data Reviewed  Clinical lab tests: ordered and reviewed  Tests in the radiology section of CPT®: ordered and reviewed  Review and summarize past medical records: yes  Independent visualization of images, tracings, or specimens: yes    Patient Progress  Patient progress: stable      PROCEDURES:  Procedures     CONSULTS:  None    CRITICAL CARE:  NONE    FINAL IMPRESSION     1. Dizziness    2. Hypertension, unspecified type    3. PURVI (acute kidney injury) ([facility] [address]    4. Closed nondisplaced fracture of proximal phalanx of right little finger, initial encounter      DISPOSITION / PLAN   DISPOSITION Decision To Discharge 04/28/2022 12:49:06 AM      Evaluation and treatment course in the ED, and plan of care upon discharge was discussed in length with the patient. Patient had no further questions prior to being discharged and was instructed to return to the ED for new or worsening symptoms. Any changes to existing medications or new prescriptions were reviewed with patient and they expressed understanding of how to correctly take their medications and the possible side effects. PATIENT REFERRED TO:  Rebekah Henderson MD  [address] ED  [address] [facility]  [phone]    As needed, If symptoms worsen      DISCHARGE MEDICATIONS:  New Prescriptions    ACETAMINOPHEN (TYLENOL) 500 MG TABLET    Take 2 tablets by mouth every 8 hours as needed for Pain    NAPROXEN (NAPROSYN) 500 MG TABLET    Take 1 tablet by mouth 2 times daily as needed for Pain    TRAMADOL (ULTRAM) 50 MG TABLET    Take 1 tablet by mouth every 4 hours as needed for Pain for up to 3 days. Intended supply: 3 days. Take lowest dose possible to manage pain       Lida Lal DO  Emergency Medicine Physician    (Please note that portions of this note were completed with a voice recognition program.  Efforts were made to edit the dictations but occasionally words are mis-transcribed.)        Timo Carranza 1721,   04/28/22 549

## 2025-03-05 ENCOUNTER — OFFICE VISIT (OUTPATIENT)
Dept: PRIMARY CARE CLINIC | Age: 50
End: 2025-03-05
Payer: COMMERCIAL

## 2025-03-05 ENCOUNTER — HOSPITAL ENCOUNTER (OUTPATIENT)
Age: 50
Discharge: HOME | End: 2025-03-05
Payer: COMMERCIAL

## 2025-03-05 VITALS
HEART RATE: 63 BPM | SYSTOLIC BLOOD PRESSURE: 182 MMHG | HEIGHT: 73 IN | DIASTOLIC BLOOD PRESSURE: 114 MMHG | WEIGHT: 204.4 LBS | BODY MASS INDEX: 27.09 KG/M2 | OXYGEN SATURATION: 100 %

## 2025-03-05 DIAGNOSIS — Z13.9 DUE FOR SCREENING: ICD-10-CM

## 2025-03-05 DIAGNOSIS — I10 ESSENTIAL HYPERTENSION: ICD-10-CM

## 2025-03-05 DIAGNOSIS — Z76.89 ENCOUNTER TO ESTABLISH CARE: Primary | ICD-10-CM

## 2025-03-05 DIAGNOSIS — D70.9 NEUTROPENIA, UNSPECIFIED TYPE: ICD-10-CM

## 2025-03-05 DIAGNOSIS — Z12.11 SCREENING FOR MALIGNANT NEOPLASM OF COLON: ICD-10-CM

## 2025-03-05 LAB
ALBUMIN SERPL-MCNC: 4.2 G/DL (ref 3.5–5.2)
ALBUMIN/GLOB SERPL: 1.4 {RATIO} (ref 1–2.5)
ALP SERPL-CCNC: 58 U/L (ref 40–129)
ALT SERPL-CCNC: 12 U/L (ref 10–50)
ANION GAP SERPL CALCULATED.3IONS-SCNC: 7 MMOL/L (ref 9–16)
AST SERPL-CCNC: 21 U/L (ref 10–50)
BACTERIA URNS QL MICRO: ABNORMAL
BASOPHILS # BLD: 0.05 K/UL (ref 0–0.2)
BASOPHILS NFR BLD: 2 % (ref 0–2)
BILIRUB SERPL-MCNC: 0.4 MG/DL (ref 0–1.2)
BILIRUB UR QL STRIP: NEGATIVE
BUN SERPL-MCNC: 9 MG/DL (ref 6–20)
CALCIUM SERPL-MCNC: 8.9 MG/DL (ref 8.6–10.4)
CASTS #/AREA URNS LPF: ABNORMAL /LPF (ref 0–8)
CHLORIDE SERPL-SCNC: 105 MMOL/L (ref 98–107)
CHLORIDE UR-SCNC: 102 MMOL/L
CHOLEST SERPL-MCNC: 175 MG/DL (ref 0–199)
CHOLESTEROL/HDL RATIO: 3.3
CLARITY UR: CLEAR
CO2 SERPL-SCNC: 27 MMOL/L (ref 20–31)
COLOR UR: YELLOW
CREAT SERPL-MCNC: 1.2 MG/DL (ref 0.7–1.2)
CREAT UR-MCNC: 112 MG/DL (ref 39–259)
CREAT UR-MCNC: 113 MG/DL (ref 39–259)
EOSINOPHIL # BLD: <0.03 K/UL (ref 0–0.44)
EOSINOPHILS RELATIVE PERCENT: 1 % (ref 1–4)
EPI CELLS #/AREA URNS HPF: ABNORMAL /HPF (ref 0–5)
ERYTHROCYTE [DISTWIDTH] IN BLOOD BY AUTOMATED COUNT: 12.3 % (ref 11.8–14.4)
EST. AVERAGE GLUCOSE BLD GHB EST-MCNC: 97 MG/DL
GFR, ESTIMATED: 74 ML/MIN/1.73M2
GLUCOSE SERPL-MCNC: 89 MG/DL (ref 74–99)
GLUCOSE UR STRIP-MCNC: NEGATIVE MG/DL
HBA1C MFR BLD: 5 % (ref 4–6)
HCT VFR BLD AUTO: 42 % (ref 40.7–50.3)
HCV AB SERPL QL IA: NONREACTIVE
HDLC SERPL-MCNC: 53 MG/DL
HGB BLD-MCNC: 13.9 G/DL (ref 13–17)
HGB UR QL STRIP.AUTO: ABNORMAL
HIV 1+2 AB+HIV1 P24 AG SERPL QL IA: NONREACTIVE
IMM GRANULOCYTES # BLD AUTO: <0.03 K/UL (ref 0–0.3)
IMM GRANULOCYTES NFR BLD: 0 %
KETONES UR STRIP-MCNC: NEGATIVE MG/DL
LDLC SERPL CALC-MCNC: 110 MG/DL (ref 0–100)
LEUKOCYTE ESTERASE UR QL STRIP: NEGATIVE
LYMPHOCYTES NFR BLD: 1.8 K/UL (ref 1.1–3.7)
LYMPHOCYTES RELATIVE PERCENT: 53 % (ref 24–43)
MCH RBC QN AUTO: 31.7 PG (ref 25.2–33.5)
MCHC RBC AUTO-ENTMCNC: 33.1 G/DL (ref 28.4–34.8)
MCV RBC AUTO: 95.9 FL (ref 82.6–102.9)
MONOCYTES NFR BLD: 0.26 K/UL (ref 0.1–1.2)
MONOCYTES NFR BLD: 8 % (ref 3–12)
NEUTROPHILS NFR BLD: 36 % (ref 36–65)
NEUTS SEG NFR BLD: 1.2 K/UL (ref 1.5–8.1)
NITRITE UR QL STRIP: NEGATIVE
NRBC BLD-RTO: 0 PER 100 WBC
PH UR STRIP: 6.5 [PH] (ref 5–8)
PLATELET # BLD AUTO: 147 K/UL (ref 138–453)
PMV BLD AUTO: 10.9 FL (ref 8.1–13.5)
POTASSIUM SERPL-SCNC: 4.3 MMOL/L (ref 3.7–5.3)
POTASSIUM, UR: 39.5 MMOL/L
PROT SERPL-MCNC: 7.3 G/DL (ref 6.6–8.7)
PROT UR STRIP-MCNC: NEGATIVE MG/DL
RBC # BLD AUTO: 4.38 M/UL (ref 4.21–5.77)
RBC #/AREA URNS HPF: ABNORMAL /HPF (ref 0–4)
SODIUM SERPL-SCNC: 139 MMOL/L (ref 136–145)
SODIUM UR-SCNC: 98 MMOL/L
SP GR UR STRIP: 1.01 (ref 1–1.03)
TOTAL PROTEIN, URINE: 7 MG/DL
TRIGL SERPL-MCNC: 62 MG/DL
TSH SERPL DL<=0.05 MIU/L-ACNC: 1.06 UIU/ML (ref 0.27–4.2)
URINE TOTAL PROTEIN CREATININE RATIO: 0.06 (ref 0–0.2)
UROBILINOGEN UR STRIP-ACNC: NORMAL EU/DL (ref 0–1)
VLDLC SERPL CALC-MCNC: 12 MG/DL (ref 1–30)
WBC #/AREA URNS HPF: ABNORMAL /HPF (ref 0–5)
WBC OTHER # BLD: 3.3 K/UL (ref 3.5–11.3)

## 2025-03-05 PROCEDURE — 84156 ASSAY OF PROTEIN URINE: CPT

## 2025-03-05 PROCEDURE — 36415 COLL VENOUS BLD VENIPUNCTURE: CPT

## 2025-03-05 PROCEDURE — 84133 ASSAY OF URINE POTASSIUM: CPT

## 2025-03-05 PROCEDURE — 99204 OFFICE O/P NEW MOD 45 MIN: CPT | Performed by: NURSE PRACTITIONER

## 2025-03-05 PROCEDURE — 80053 COMPREHEN METABOLIC PANEL: CPT

## 2025-03-05 PROCEDURE — 82436 ASSAY OF URINE CHLORIDE: CPT

## 2025-03-05 PROCEDURE — 84443 ASSAY THYROID STIM HORMONE: CPT

## 2025-03-05 PROCEDURE — 87389 HIV-1 AG W/HIV-1&-2 AB AG IA: CPT

## 2025-03-05 PROCEDURE — 85025 COMPLETE CBC W/AUTO DIFF WBC: CPT

## 2025-03-05 PROCEDURE — 82088 ASSAY OF ALDOSTERONE: CPT

## 2025-03-05 PROCEDURE — 82570 ASSAY OF URINE CREATININE: CPT

## 2025-03-05 PROCEDURE — 86803 HEPATITIS C AB TEST: CPT

## 2025-03-05 PROCEDURE — 83036 HEMOGLOBIN GLYCOSYLATED A1C: CPT

## 2025-03-05 PROCEDURE — 84300 ASSAY OF URINE SODIUM: CPT

## 2025-03-05 PROCEDURE — 84244 ASSAY OF RENIN: CPT

## 2025-03-05 PROCEDURE — 80061 LIPID PANEL: CPT

## 2025-03-05 PROCEDURE — 3080F DIAST BP >= 90 MM HG: CPT | Performed by: NURSE PRACTITIONER

## 2025-03-05 PROCEDURE — 3077F SYST BP >= 140 MM HG: CPT | Performed by: NURSE PRACTITIONER

## 2025-03-05 PROCEDURE — 81001 URINALYSIS AUTO W/SCOPE: CPT

## 2025-03-05 RX ORDER — LISINOPRIL 20 MG/1
20 TABLET ORAL DAILY
Qty: 30 TABLET | Refills: 3 | Status: SHIPPED | OUTPATIENT
Start: 2025-03-05 | End: 2025-07-03

## 2025-03-05 RX ORDER — HYDROCHLOROTHIAZIDE 25 MG/1
25 TABLET ORAL EVERY MORNING
Qty: 30 TABLET | Refills: 3 | Status: SHIPPED | OUTPATIENT
Start: 2025-03-05 | End: 2025-07-03

## 2025-03-05 RX ORDER — AMLODIPINE BESYLATE 10 MG/1
TABLET ORAL
Qty: 30 TABLET | Refills: 3 | Status: SHIPPED | OUTPATIENT
Start: 2025-03-05

## 2025-03-05 SDOH — ECONOMIC STABILITY: FOOD INSECURITY: WITHIN THE PAST 12 MONTHS, THE FOOD YOU BOUGHT JUST DIDN'T LAST AND YOU DIDN'T HAVE MONEY TO GET MORE.: NEVER TRUE

## 2025-03-05 SDOH — ECONOMIC STABILITY: FOOD INSECURITY: WITHIN THE PAST 12 MONTHS, YOU WORRIED THAT YOUR FOOD WOULD RUN OUT BEFORE YOU GOT MONEY TO BUY MORE.: NEVER TRUE

## 2025-03-05 ASSESSMENT — PATIENT HEALTH QUESTIONNAIRE - PHQ9
1. LITTLE INTEREST OR PLEASURE IN DOING THINGS: NOT AT ALL
SUM OF ALL RESPONSES TO PHQ QUESTIONS 1-9: 0
2. FEELING DOWN, DEPRESSED OR HOPELESS: NOT AT ALL
SUM OF ALL RESPONSES TO PHQ QUESTIONS 1-9: 0

## 2025-03-05 ASSESSMENT — ENCOUNTER SYMPTOMS
COUGH: 0
SHORTNESS OF BREATH: 0
NAUSEA: 0
BACK PAIN: 0
ABDOMINAL PAIN: 0
SORE THROAT: 0
VOMITING: 0
SINUS PAIN: 0
PHOTOPHOBIA: 0
DIARRHEA: 0
COLOR CHANGE: 0
CHEST TIGHTNESS: 0
SINUS PRESSURE: 0

## 2025-03-05 NOTE — PROGRESS NOTES
[address] [facility] MAIN [facility] [address]   3/5/2025    Harris Martinez is a 49 y.o. male who presents today for his medical conditions and/or complaints as noted below.    Harris Martinez is scheduled today for New Patient and Establish Care  .      HPI:     History of Present Illness  The patient is a 49-year-old male who presents today to establish care. He has not had a primary care provider since February 2021. He has a long history of uncontrolled hypertension. He has had one follow-up appointment with nephrology with lab work ordered, however, not completed.    He reports no symptoms such as headaches, vision changes, chest pain, or shortness of breath. He does not have a home blood pressure monitor. He has no known allergies and has previously tolerated his medications well. He is open to undergoing blood work. He is currently fasting and has not consumed any food today. He does not consume coffee, Red Bull, or Monster energy drinks. Drinks soda regularly.     He is sexually active and does not require STD screening.     No new pertinent medical or surgical history.     Supplemental Information    SOCIAL HISTORY  He does not smoke. He uses marijuana regularly. He drinks alcohol occasionally.    FAMILY HISTORY  His maternal grandmother had diabetes and arthritis. He has a cousin on his mother's side with sickle cell anemia. He had an aunt and a cousin on his mother's side who passed away from lung cancer related to cigarette smoking.    ALLERGIES  The patient has no known allergies.    MEDICATIONS  Discontinued: Amlodipine, hydrochlorothiazide, lisinopril      Vitals:    03/05/25 0941   BP: (!) 182/114   Site: Left Upper Arm   Position: Sitting   Cuff Size: Large Adult   Pulse: 63   SpO2: 100%   Weight: 92.7 kg (204 lb 6.4 oz)   Height: 1.854 m (6' 1\")      Past Medical History:   Diagnosis Date    Hypertension       Past Surgical History:   Procedure Laterality Date    HAND

## 2025-03-07 LAB
ALDOST SERPL-MCNC: 7.1 NG/DL
ALDOSTERONE COMMENT: NORMAL

## 2025-03-08 LAB
RENIN COMMENT: NORMAL
RENIN PLAS-CCNC: <0.1 NG/ML/HR

## 2025-03-25 ENCOUNTER — RESULTS FOLLOW-UP (OUTPATIENT)
Dept: PRIMARY CARE CLINIC | Age: 50
End: 2025-03-25

## 2025-03-25 DIAGNOSIS — R19.5 POSITIVE COLORECTAL CANCER SCREENING USING COLOGUARD TEST: Primary | ICD-10-CM

## 2025-03-25 LAB — NONINV COLON CA DNA+OCC BLD SCRN STL QL: POSITIVE

## 2025-03-26 DIAGNOSIS — R19.5 POSITIVE COLORECTAL CANCER SCREENING USING COLOGUARD TEST: Primary | ICD-10-CM

## 2025-03-26 NOTE — TELEPHONE ENCOUNTER
Patient called to schedule from referral for Positive colorectal cancer screening using Cologuard test   Please return call  Thank you

## 2025-03-27 ENCOUNTER — PREP FOR PROCEDURE (OUTPATIENT)
Dept: GASTROENTEROLOGY | Age: 50
End: 2025-03-27

## 2025-03-27 DIAGNOSIS — R19.5 POSITIVE COLORECTAL CANCER SCREENING USING COLOGUARD TEST: ICD-10-CM

## 2025-03-27 NOTE — TELEPHONE ENCOUNTER
Procedure scheduled/Dr Jesus  Procedure: colon   Dx: positive cologuard   Date: 4/9/25   Time: 245pm arrival 1245pm   Hospital: [facility] phone call: tbd   Bowel Prep instructions given: golytely/dulc   In office/via phone: phone   Clearance needed: none  GLP - 1: none

## 2025-03-28 RX ORDER — POLYETHYLENE GLYCOL 3350, SODIUM SULFATE ANHYDROUS, SODIUM BICARBONATE, SODIUM CHLORIDE, POTASSIUM CHLORIDE 236; 22.74; 6.74; 5.86; 2.97 G/4L; G/4L; G/4L; G/4L; G/4L
4 POWDER, FOR SOLUTION ORAL ONCE
Qty: 4000 ML | Refills: 0 | Status: SHIPPED | OUTPATIENT
Start: 2025-03-28 | End: 2025-03-28

## 2025-03-28 RX ORDER — BISACODYL 5 MG
TABLET, DELAYED RELEASE (ENTERIC COATED) ORAL
Qty: 4 TABLET | Refills: 0 | Status: SHIPPED | OUTPATIENT
Start: 2025-03-28

## 2025-04-02 ENCOUNTER — HOSPITAL ENCOUNTER (OUTPATIENT)
Age: 50
Discharge: HOME | End: 2025-04-02
Payer: COMMERCIAL

## 2025-04-02 ENCOUNTER — OFFICE VISIT (OUTPATIENT)
Dept: PRIMARY CARE CLINIC | Age: 50
End: 2025-04-02
Payer: COMMERCIAL

## 2025-04-02 VITALS
BODY MASS INDEX: 26.97 KG/M2 | SYSTOLIC BLOOD PRESSURE: 130 MMHG | OXYGEN SATURATION: 100 % | HEART RATE: 87 BPM | DIASTOLIC BLOOD PRESSURE: 102 MMHG | HEIGHT: 73 IN

## 2025-04-02 DIAGNOSIS — I10 ESSENTIAL HYPERTENSION: Primary | ICD-10-CM

## 2025-04-02 DIAGNOSIS — D70.9 NEUTROPENIA, UNSPECIFIED TYPE: ICD-10-CM

## 2025-04-02 LAB
BASOPHILS # BLD: 0.05 K/UL (ref 0–0.2)
BASOPHILS NFR BLD: 1 % (ref 0–2)
EOSINOPHIL # BLD: 0.03 K/UL (ref 0–0.44)
EOSINOPHILS RELATIVE PERCENT: 1 % (ref 1–4)
ERYTHROCYTE [DISTWIDTH] IN BLOOD BY AUTOMATED COUNT: 11.6 % (ref 11.8–14.4)
HCT VFR BLD AUTO: 42.2 % (ref 40.7–50.3)
HGB BLD-MCNC: 14.4 G/DL (ref 13–17)
IMM GRANULOCYTES # BLD AUTO: <0.03 K/UL (ref 0–0.3)
IMM GRANULOCYTES NFR BLD: 0 %
LYMPHOCYTES NFR BLD: 2.21 K/UL (ref 1.1–3.7)
LYMPHOCYTES RELATIVE PERCENT: 50 % (ref 24–43)
MCH RBC QN AUTO: 31.6 PG (ref 25.2–33.5)
MCHC RBC AUTO-ENTMCNC: 34.1 G/DL (ref 28.4–34.8)
MCV RBC AUTO: 92.5 FL (ref 82.6–102.9)
MONOCYTES NFR BLD: 0.38 K/UL (ref 0.1–1.2)
MONOCYTES NFR BLD: 9 % (ref 3–12)
NEUTROPHILS NFR BLD: 39 % (ref 36–65)
NEUTS SEG NFR BLD: 1.72 K/UL (ref 1.5–8.1)
NRBC BLD-RTO: 0 PER 100 WBC
PLATELET # BLD AUTO: 172 K/UL (ref 138–453)
PMV BLD AUTO: 10.3 FL (ref 8.1–13.5)
RBC # BLD AUTO: 4.56 M/UL (ref 4.21–5.77)
WBC OTHER # BLD: 4.4 K/UL (ref 3.5–11.3)

## 2025-04-02 PROCEDURE — 3075F SYST BP GE 130 - 139MM HG: CPT | Performed by: NURSE PRACTITIONER

## 2025-04-02 PROCEDURE — 36415 COLL VENOUS BLD VENIPUNCTURE: CPT

## 2025-04-02 PROCEDURE — 3080F DIAST BP >= 90 MM HG: CPT | Performed by: NURSE PRACTITIONER

## 2025-04-02 PROCEDURE — 99213 OFFICE O/P EST LOW 20 MIN: CPT | Performed by: NURSE PRACTITIONER

## 2025-04-02 PROCEDURE — 85025 COMPLETE CBC W/AUTO DIFF WBC: CPT

## 2025-04-02 RX ORDER — AMLODIPINE BESYLATE 10 MG/1
TABLET ORAL
Qty: 30 TABLET | Refills: 3 | Status: SHIPPED | OUTPATIENT
Start: 2025-04-02

## 2025-04-02 RX ORDER — LISINOPRIL 30 MG/1
30 TABLET ORAL DAILY
Qty: 90 TABLET | Refills: 0 | Status: SHIPPED | OUTPATIENT
Start: 2025-04-02 | End: 2025-04-02

## 2025-04-02 RX ORDER — LISINOPRIL 30 MG/1
30 TABLET ORAL DAILY
Qty: 30 TABLET | Refills: 3 | Status: SHIPPED | OUTPATIENT
Start: 2025-04-02 | End: 2025-07-31

## 2025-04-02 NOTE — PROGRESS NOTES
[address] [facility] MAIN [facility] [address]   4/2/2025    Harris Martinez is a 49 y.o. male who presents today for his medical conditions and/or complaints as noted below.    Harris Martinez is scheduled today for Hypertension  .      HPI:     History of Present Illness  The patient is a 49-year-old male who presents today for follow-up on hypertension.    An improvement in his condition is reported, attributed to the consistent use of amlodipine and lisinopril. However, adherence to the prescribed regimen of hydrochlorothiazide has not been maintained due to concerns about potential side effects, including frequent urination and perceived prostate weakness. During the last visit, systolic blood pressure was recorded at 182, a significant increase from the usual range of 138 to 140. He is considering incorporating home exercises into his routine to further manage hypertension.    Importance of repeating CBC emphasized given low WBC/ANC abnormality on blood work.     PAST SURGICAL HISTORY:  Colonoscopy in 2008.      Vitals:    04/02/25 1049 04/02/25 1118   BP: (!) 139/106 (!) 130/102   BP Site: Left Upper Arm    Patient Position: Sitting    BP Cuff Size: Medium Adult    Pulse: 87    SpO2: 100%    Height: 1.854 m (6' 1\")       Past Medical History:   Diagnosis Date    Hypertension       Past Surgical History:   Procedure Laterality Date    HAND TENDON SURGERY Right     INGUINAL HERNIA REPAIR      x2     Family History   Problem Relation Age of Onset    Hypertension Mother     Cancer Maternal Aunt         lung cancer    Diabetes Maternal Grandmother     Anemia Maternal Cousin         sickle cell     Social History     Tobacco Use    Smoking status: Never    Smokeless tobacco: Never   Substance Use Topics    Alcohol use: Yes     Comment: occassionally      Current Outpatient Medications   Medication Sig Dispense Refill    amLODIPine (NORVASC) 10 MG tablet take 1 tablet by mouth once daily 30

## 2025-04-03 ENCOUNTER — RESULTS FOLLOW-UP (OUTPATIENT)
Dept: PRIMARY CARE CLINIC | Age: 50
End: 2025-04-03

## 2025-04-03 ENCOUNTER — PREP FOR PROCEDURE (OUTPATIENT)
Dept: GASTROENTEROLOGY | Age: 50
End: 2025-04-03

## 2025-04-03 ENCOUNTER — HOSPITAL ENCOUNTER (OUTPATIENT)
Dept: PREADMISSION TESTING | Age: 50
Discharge: HOME | End: 2025-04-03

## 2025-04-03 DIAGNOSIS — R19.5 POSITIVE FIT (FECAL IMMUNOCHEMICAL TEST): ICD-10-CM

## 2025-04-03 NOTE — PROGRESS NOTES
Writer for PAT 4/9/25 @ 4640 Colonoscopy with Edin. Patient needs office to call him and reschedule appointment. Patient has job training and unable to do bowel prep at the same time. Sent office message.

## 2025-05-21 ENCOUNTER — HOSPITAL ENCOUNTER (OUTPATIENT)
Dept: PREADMISSION TESTING | Age: 50
Discharge: HOME | End: 2025-05-25

## 2025-05-21 VITALS — HEIGHT: 73 IN | BODY MASS INDEX: 27.04 KG/M2 | WEIGHT: 204 LBS

## 2025-05-21 RX ORDER — AMOXICILLIN 500 MG/1
500 CAPSULE ORAL 3 TIMES DAILY
COMMUNITY

## 2025-05-21 NOTE — PROGRESS NOTES

## 2025-06-02 NOTE — PRE-PROCEDURE INSTRUCTIONS
Have you received your Prep? Any questions with prep instructions?Y  Only Clear Liquid Diet day before.Y  Nothing to eat after midnight day before procedure.Y  Are you taking any blood thinners? If so, you need to Stop.N  Remove any jewelry and body piercings.Y  Do you wear glasses? If so, please bring a case to store them in.  Are you having any Covid symptoms?N  Do you have any new rashes, infections, etc. that we should be aware of?N  Do you have a ride home the day of surgery? It cannot be a cab or medical transportation.Y  Verify surgery time/date and what time to arrive at hospital.1200

## 2025-06-03 ENCOUNTER — ANESTHESIA EVENT (OUTPATIENT)
Dept: ENDOSCOPY | Age: 50
End: 2025-06-03
Payer: COMMERCIAL

## 2025-06-04 ENCOUNTER — HOSPITAL ENCOUNTER (OUTPATIENT)
Age: 50
Setting detail: OUTPATIENT SURGERY
Discharge: HOME | End: 2025-06-04
Attending: STUDENT IN AN ORGANIZED HEALTH CARE EDUCATION/TRAINING PROGRAM | Admitting: STUDENT IN AN ORGANIZED HEALTH CARE EDUCATION/TRAINING PROGRAM
Payer: COMMERCIAL

## 2025-06-04 ENCOUNTER — ANESTHESIA (OUTPATIENT)
Dept: ENDOSCOPY | Age: 50
End: 2025-06-04
Payer: COMMERCIAL

## 2025-06-04 VITALS
TEMPERATURE: 97.2 F | RESPIRATION RATE: 25 BRPM | BODY MASS INDEX: 27.04 KG/M2 | HEART RATE: 85 BPM | OXYGEN SATURATION: 100 % | WEIGHT: 204 LBS | HEIGHT: 73 IN | SYSTOLIC BLOOD PRESSURE: 111 MMHG | DIASTOLIC BLOOD PRESSURE: 72 MMHG

## 2025-06-04 DIAGNOSIS — R19.5 POSITIVE FIT (FECAL IMMUNOCHEMICAL TEST): ICD-10-CM

## 2025-06-04 PROCEDURE — 45381 COLONOSCOPY SUBMUCOUS NJX: CPT | Performed by: STUDENT IN AN ORGANIZED HEALTH CARE EDUCATION/TRAINING PROGRAM

## 2025-06-04 PROCEDURE — 2709999900 HC NON-CHARGEABLE SUPPLY: Performed by: STUDENT IN AN ORGANIZED HEALTH CARE EDUCATION/TRAINING PROGRAM

## 2025-06-04 PROCEDURE — 45385 COLONOSCOPY W/LESION REMOVAL: CPT | Performed by: STUDENT IN AN ORGANIZED HEALTH CARE EDUCATION/TRAINING PROGRAM

## 2025-06-04 PROCEDURE — 6360000002 HC RX W HCPCS: Performed by: NURSE ANESTHETIST, CERTIFIED REGISTERED

## 2025-06-04 PROCEDURE — 7100000011 HC PHASE II RECOVERY - ADDTL 15 MIN: Performed by: STUDENT IN AN ORGANIZED HEALTH CARE EDUCATION/TRAINING PROGRAM

## 2025-06-04 PROCEDURE — 7100000010 HC PHASE II RECOVERY - FIRST 15 MIN: Performed by: STUDENT IN AN ORGANIZED HEALTH CARE EDUCATION/TRAINING PROGRAM

## 2025-06-04 PROCEDURE — 2580000003 HC RX 258: Performed by: ANESTHESIOLOGY

## 2025-06-04 PROCEDURE — 3700000000 HC ANESTHESIA ATTENDED CARE: Performed by: STUDENT IN AN ORGANIZED HEALTH CARE EDUCATION/TRAINING PROGRAM

## 2025-06-04 PROCEDURE — C1889 IMPLANT/INSERT DEVICE, NOC: HCPCS | Performed by: STUDENT IN AN ORGANIZED HEALTH CARE EDUCATION/TRAINING PROGRAM

## 2025-06-04 PROCEDURE — 88305 TISSUE EXAM BY PATHOLOGIST: CPT

## 2025-06-04 PROCEDURE — 3700000001 HC ADD 15 MINUTES (ANESTHESIA): Performed by: STUDENT IN AN ORGANIZED HEALTH CARE EDUCATION/TRAINING PROGRAM

## 2025-06-04 PROCEDURE — 3609010600 HC COLONOSCOPY POLYPECTOMY SNARE/COLD BIOPSY: Performed by: STUDENT IN AN ORGANIZED HEALTH CARE EDUCATION/TRAINING PROGRAM

## 2025-06-04 DEVICE — WORKING LENGTH 235CM, WORKING CHANNEL 2.8MM
Type: IMPLANTABLE DEVICE | Site: SIGMOID COLON | Status: FUNCTIONAL
Brand: RESOLUTION 360 CLIP

## 2025-06-04 RX ORDER — LIDOCAINE HYDROCHLORIDE 10 MG/ML
1 INJECTION, SOLUTION EPIDURAL; INFILTRATION; INTRACAUDAL; PERINEURAL
Status: DISCONTINUED | OUTPATIENT
Start: 2025-06-04 | End: 2025-06-04 | Stop reason: HOSPADM

## 2025-06-04 RX ORDER — SODIUM CHLORIDE 0.9 % (FLUSH) 0.9 %
5-40 SYRINGE (ML) INJECTION EVERY 12 HOURS SCHEDULED
Status: DISCONTINUED | OUTPATIENT
Start: 2025-06-04 | End: 2025-06-04 | Stop reason: HOSPADM

## 2025-06-04 RX ORDER — SODIUM CHLORIDE 0.9 % (FLUSH) 0.9 %
5-40 SYRINGE (ML) INJECTION PRN
Status: DISCONTINUED | OUTPATIENT
Start: 2025-06-04 | End: 2025-06-04 | Stop reason: HOSPADM

## 2025-06-04 RX ORDER — SODIUM CHLORIDE, SODIUM LACTATE, POTASSIUM CHLORIDE, CALCIUM CHLORIDE 600; 310; 30; 20 MG/100ML; MG/100ML; MG/100ML; MG/100ML
INJECTION, SOLUTION INTRAVENOUS CONTINUOUS
Status: DISCONTINUED | OUTPATIENT
Start: 2025-06-04 | End: 2025-06-04 | Stop reason: HOSPADM

## 2025-06-04 RX ORDER — SIMETHICONE 40MG/0.6ML
SUSPENSION, DROPS(FINAL DOSAGE FORM)(ML) ORAL PRN
Status: DISCONTINUED | OUTPATIENT
Start: 2025-06-04 | End: 2025-06-04 | Stop reason: ALTCHOICE

## 2025-06-04 RX ORDER — MIDAZOLAM HYDROCHLORIDE 2 MG/2ML
INJECTION, SOLUTION INTRAMUSCULAR; INTRAVENOUS
Status: DISCONTINUED | OUTPATIENT
Start: 2025-06-04 | End: 2025-06-04 | Stop reason: SDUPTHER

## 2025-06-04 RX ORDER — PROPOFOL 10 MG/ML
INJECTION, EMULSION INTRAVENOUS
Status: DISCONTINUED | OUTPATIENT
Start: 2025-06-04 | End: 2025-06-04 | Stop reason: SDUPTHER

## 2025-06-04 RX ORDER — SODIUM CHLORIDE 9 MG/ML
INJECTION, SOLUTION INTRAVENOUS PRN
Status: DISCONTINUED | OUTPATIENT
Start: 2025-06-04 | End: 2025-06-04 | Stop reason: HOSPADM

## 2025-06-04 RX ADMIN — SODIUM CHLORIDE, POTASSIUM CHLORIDE, SODIUM LACTATE AND CALCIUM CHLORIDE: 600; 310; 30; 20 INJECTION, SOLUTION INTRAVENOUS at 13:13

## 2025-06-04 RX ADMIN — PROPOFOL 80 MG: 10 INJECTION, EMULSION INTRAVENOUS at 14:44

## 2025-06-04 RX ADMIN — MIDAZOLAM HYDROCHLORIDE 2 MG: 1 INJECTION, SOLUTION INTRAMUSCULAR; INTRAVENOUS at 14:40

## 2025-06-04 RX ADMIN — PROPOFOL 150 MCG/KG/MIN: 10 INJECTION, EMULSION INTRAVENOUS at 14:44

## 2025-06-04 ASSESSMENT — ENCOUNTER SYMPTOMS
COUGH: 0
SHORTNESS OF BREATH: 0
SORE THROAT: 0

## 2025-06-04 ASSESSMENT — PAIN - FUNCTIONAL ASSESSMENT
PAIN_FUNCTIONAL_ASSESSMENT: 0-10
PAIN_FUNCTIONAL_ASSESSMENT: NONE - DENIES PAIN

## 2025-06-04 NOTE — ANESTHESIA PRE PROCEDURE
Department of Anesthesiology  Preprocedure Note       Name:  Harris Martinez   Age:  50 y.o.  :  1975                                          MRN:  [id number]         Date:  2025      Surgeon: Surgeon(s):  Koko Jesus MD    Procedure: Procedure(s):  COLONOSCOPY DIAGNOSTIC    Medications prior to admission:   Prior to Admission medications    Medication Sig Start Date End Date Taking? Authorizing Provider   amLODIPine (NORVASC) 10 MG tablet take 1 tablet by mouth once daily 25  Yes Mana Fuentes APRN - CNP   lisinopril (PRINIVIL;ZESTRIL) 30 MG tablet Take 1 tablet by mouth daily 25 Yes Mana Fuentes APRN - CNP   bisacodyl 5 MG EC tablet Please follow instructions given to you by your provider. 3/28/25  Yes Koko Jesus MD   amoxicillin (AMOXIL) 500 MG capsule Take 1 capsule by mouth 3 times daily  Patient not taking: Reported on 2025    Provider, MD Elbert       Current medications:    Current Facility-Administered Medications   Medication Dose Route Frequency Provider Last Rate Last Admin   • lidocaine PF 1 % injection 1 mL  1 mL IntraDERmal Once PRN Ha Garcia MD       • lactated ringers infusion   IntraVENous Continuous Ha Garcia  mL/hr at 25 1313 New Bag at 25 1313   • sodium chloride flush 0.9 % injection 5-40 mL  5-40 mL IntraVENous 2 times per day Ha Garcia MD       • sodium chloride flush 0.9 % injection 5-40 mL  5-40 mL IntraVENous PRN Ha Garcia MD       • 0.9 % sodium chloride infusion   IntraVENous PRN Ha Garcia MD           Allergies:  No Known Allergies    Problem List:    Patient Active Problem List   Diagnosis Code   • Essential hypertension I10   • Family history of diabetes mellitus Z83.3   • Positive colorectal cancer screening using Cologuard test R19.5   • Positive FIT (fecal immunochemical test) R19.5       Past Medical History:        Diagnosis Date   • Hemorrhoid     flare intermittently

## 2025-06-04 NOTE — DISCHARGE INSTRUCTIONS
Avoid NSAIDs or lifting heavy objects for 1 week     Colonoscopy: What to Expect at Home  Your Recovery  After you have a colonoscopy, you will stay at the clinic for 1 to 2 hours until the medicines wear off. Then you can go home, but you will need to arrange for a ride. Your doctor will tell you when you can eat and do your other usual activities.  Your doctor will talk to you about when you will need your next colonoscopy. The results of your test and your risk for colorectal cancer will help your doctor decide how often you need to be checked.  After the test, you may be bloated or have gas pains. You may need to pass gas. If a biopsy was done or a polyp was removed, you may have streaks of blood in your stool (feces) for a few days.  This care sheet gives you a general idea about how long it will take for you to recover. But each person recovers at a different pace. Follow the steps below to get better as quickly as possible.  How can you care for yourself at home?  Activity  Rest as much as you need to after you go home.  You should be able to go back to your usual activities the day after the test.  Diet  Follow your doctor’s directions for eating.  Drink plenty of fluids (unless your doctor has told you not to) to replace the fluids that were lost during the colon prep.  Do not drink alcohol.  Medicines  If polyps were removed or a biopsy was done during the test, your doctor may tell you not to take aspirin or other anti-inflammatory medicines, such as ibuprofen (Advil, Motrin) and naproxen (Aleve), for a few days.  Other instructions  For your safety, you should not drive or operate machinery until the medicine effects are gone and you can think clearly. Your doctor may tell you not to drive or operate machinery until the day after your test.  Do not sign legal documents or make major decisions until the medicine effects are gone and you can think clearly. The anesthesia medicine can make it hard for you  that require you to be alert or coordinated. Do not:  Drive.  Swim.  Ride a bicycle.  Operate heavy machinery.  Cook.  Use power tools.  Climb ladders.  Work at heights.  Take a bath.  Do not drink alcohol.  Do not make any important decisions or sign legal documents.  Stay with an adult.  The first meal following your procedure should be light and small. Avoid solid foods if you feel sick to your stomach (nauseous) or if you throw up (vomit).  Drink enough fluids to keep your urine clear or pale yellow.  Only take your usual medicines or new medicines if your caregiver approves them.  Only take over-the-counter or prescription medicines for pain, discomfort, or fever as directed by your caregiver.  Keep all follow-up appointments as directed by your caregiver.  SEEK IMMEDIATE MEDICAL CARE IF:   You are not feeling normal or behaving normally after 24 hours.  You have persistent nausea and vomiting.  You are unable to drink fluids or eat food.  You have difficulty urinating.  You have difficulty breathing or speaking.  You have blue or gray skin.  There is difficulty waking or you cannot be woken up.  You have heavy bleeding, redness, or a lot of swelling where the sedative or anesthesia entered your skin (intravenous site).  You have a rash.  MAKE SURE YOU:  Understand these instructions.  Will watch your condition.  Will get help right away if you are not doing well or get worse.  Document Released: 12/18/2006 Document Revised: 06/18/2013 Document Reviewed: 04/17/2013  [employer]® Patient Information ©2013 [employer], [place].

## 2025-06-04 NOTE — OP NOTE
COLONOSCOPY    DATE OF PROCEDURE: 6/4/2025    SURGEON: Koko Jesus MD  Facility : [facility]  ASSISTANT: None  PREOPERATIVE DIAGNOSIS: Positive CologKARLEY chuaR    POSTOPERATIVE DIAGNOSIS: as described below    OPERATION: Total colonoscopy with snare polypectomy and tattoo injection    ANESTHESIA: Monitored Anesthesia Care (MAC)    ESTIMATED BLOOD LOSS: less than 50 cc    COMPLICATIONS: None.     SPECIMENS:  Was Obtained:     ID Type Source Tests Collected by Time Destination   A : SIGMOID COLON POLYP Tissue Sigmoid Colon SURGICAL PATHOLOGY Koko Jesus MD 6/4/2025 7056         HISTORY: The patient is a 50 y.o. year old male with history of above preop diagnosis.  I recommended colonoscopy with possible biopsy or polypectomy and I explained the risk, benefits, expected outcome, and alternatives to the procedure.  Risks included but are not limited to medication allergy, medication reaction, cardiovascular and respiratory problems, bleeding, perforation, infection, and/or missed diagnosis.  The patient understands and is in agreement.        PROCEDURE: Following arrival in the endoscopy room, the patient was placed in the left lateral decubitus position and final time-out accomplished in the presence of the nursing staff. Baseline vital signs were obtained and reviewed. The patient was given IV Monitored Anesthesia Care (MAC) and vitals monitoring per anesthesia department.     Digital rectal exam- normal    The colonoscope was inserted per rectum and advanced under direct vision to the cecum without difficulty.  Photodocumentation of the maximal extent reached (cecum, appendiceal orifice, ileocecal valve, and terminal ileum if indicated) and other findings was obtained.     Post sedation note :The patient's SPO2 remained above 90% throughout the procedure.the vital signs remained stable , and no immediate complication from the procedure noted, patient will be ready to leave when criteria is met  .        The prep was good after extensive lavage     Findings:    A 25 mm pedunculated polyp was found in the distal sigmoid colon 20 cm from the anus.  The polyp was resected using a hot snare.   There was no bleeding at the end of the procedure.  Resection and retrieval was complete.  2 clips placed to prevent delayed post polypectomy bleed.  3 cc Anayeli ink tattoo placed 2 cm distal and opposite the site of resection    Otherwise normal Cecum, Ascending, Transverse, Descending, and Sigmoid colon     Rectum/Anus: examined in normal and retroflexed positions and showed medium internal hemorrhoids    Withdrawal Time was (minutes): 17    The colon was decompressed and the scope was removed.  The patient tolerated the procedure well.     Impression:   25 mm sigmoid polyp-hot snare, clip, tattooed    Recommendations/Plan:   F/U Biopsies  Avoid NSAIDs or lifting heavy objects for 1 week  Topical hemorrhoidal care  Return to normal activities from tomorrow. Follow-up with PCP as scheduled.  Repeat colonoscopy within 1 year or earlier based on pathology    Electronically signed by Koko Jesus MD  on 6/4/2025 at 3:20 PM   This note is created with the assistance of a speech recognition program.  While intending to generate a document that actually reflects the content of the procedure, the document can still have some errors including those of syntax and sound a like substitutions which may escape proofreading.  It such instances, actual meaning can be extrapolated by contextual diversion.

## 2025-06-04 NOTE — H&P
HISTORY and PHYSICAL  [facility]       NAME:  Harris Martinez  MRN: [id number]   YOB: 1975   Date: 6/4/2025   Age: 50 y.o.  Gender: male       COMPLAINT AND PRESENT HISTORY:       Harris Martinez is 50 y.o.  male, here for     Procedure(s):  COLONOSCOPY DIAGNOSTIC    Pre-Op Diagnosis Codes:      * Positive FIT (fecal immunochemical test) [R19.5]    Pt has had previous colonoscopy last in 2008.   Denies abdominal pain, dysphagia, heartburn.  Denies nausea, vomiting.  Denies diarrhea, constipation.  Pt has occasional blood in stool. Pt reports known hemorrhoids.  Denies dark tarry stools.  Denies changes in appetite and unintended weight loss.  Denies family history of colon cancer.    Completed and followed prescribed prep. NPO p MN. Denies taking any blood thinning medications. Denies recent or current chest pain/pressure, palpitations, SOB, recent URI, fever or chills.       RECENT LABS, IMAGING AND TESTING     Lab Results   Component Value Date    WBC 4.4 04/02/2025    RBC 4.56 04/02/2025    HGB 14.4 04/02/2025    HCT 42.2 04/02/2025    MCV 92.5 04/02/2025    MCH 31.6 04/02/2025    MCHC 34.1 04/02/2025    RDW 11.6 (L) 04/02/2025     04/02/2025    MPV 10.3 04/02/2025        Lab Results   Component Value Date     03/05/2025    K 4.3 03/05/2025     03/05/2025    CO2 27 03/05/2025    BUN 9 03/05/2025    CREATININE 1.2 03/05/2025    GLUCOSE 89 03/05/2025    CALCIUM 8.9 03/05/2025    BILITOT 0.4 03/05/2025    ALKPHOS 58 03/05/2025    AST 21 03/05/2025    ALT 12 03/05/2025       No results found.    PAST MEDICAL HISTORY     Past Medical History:   Diagnosis Date    Hemorrhoid     flare intermittently    Hypertension        SURGICAL HISTORY       Past Surgical History:   Procedure Laterality Date    COLONOSCOPY  2008    HAND TENDON SURGERY Right     INGUINAL HERNIA REPAIR      x2       FAMILY HISTORY       Family History   Problem Relation Age of Onset    Hypertension

## 2025-06-04 NOTE — ANESTHESIA POSTPROCEDURE EVALUATION
Department of Anesthesiology  Postprocedure Note    Patient: Harris Martinez  MRN: [id number]  YOB: 1975  Date of evaluation: 6/4/2025    Procedure Summary       Date: 06/04/25 Room / Location: [place] [address] / [facility]    Anesthesia Start: 1440 Anesthesia Stop: 1516    Procedure: COLONOSCOPY POLYPECTOMY HOT SNARE X2 RESOLUTION CLIP APPLICATION TATTOO INK INJECTION AT SIGMOID COLON (Rectum) Diagnosis:       Positive FIT (fecal immunochemical test)      (Positive FIT (fecal immunochemical test) [R19.5])    Surgeons: Koko Jesus MD Responsible Provider: Nate Pretty MD    Anesthesia Type: general, TIVA ASA Status: 2            Anesthesia Type: No value filed.    Joceline Phase I: Joceline Score: 10    Joceline Phase II: Joceline Score: 10    Anesthesia Post Evaluation    Patient location during evaluation: PACU  Patient participation: complete - patient participated  Level of consciousness: awake and alert  Airway patency: patent  Nausea & Vomiting: no vomiting  Cardiovascular status: hemodynamically stable  Respiratory status: acceptable  Hydration status: euvolemic  Comments: POST- ANESTHESIA EVALUATION       Pt Name: Harris Martinez  MRN: [id number]  YOB: 1975  Date of evaluation: 6/4/2025  Time:  4:34 PM      /72   Pulse 85   Temp 97.2 °F (36.2 °C)   Resp 25   Ht 1.854 m (6' 1\")   Wt 92.5 kg (204 lb)   SpO2 100%   BMI 26.91 kg/m²      Consciousness Level  Awake  Cardiopulmonary Status  Stable  Pain Adequately Treated YES  Nausea / Vomiting  NO  Adequate Hydration  YES  Anesthesia Related Complications NONE      Electronically signed by Nate Pretty MD on 6/4/2025 at 4:34 PM         Pain management: satisfactory to patient    No notable events documented.

## 2025-06-05 ENCOUNTER — OFFICE VISIT (OUTPATIENT)
Dept: PRIMARY CARE CLINIC | Age: 50
End: 2025-06-05
Payer: COMMERCIAL

## 2025-06-05 VITALS
HEART RATE: 69 BPM | OXYGEN SATURATION: 100 % | WEIGHT: 204 LBS | BODY MASS INDEX: 27.04 KG/M2 | DIASTOLIC BLOOD PRESSURE: 86 MMHG | SYSTOLIC BLOOD PRESSURE: 128 MMHG | HEIGHT: 73 IN

## 2025-06-05 DIAGNOSIS — I10 ESSENTIAL HYPERTENSION: Primary | ICD-10-CM

## 2025-06-05 PROCEDURE — 3074F SYST BP LT 130 MM HG: CPT | Performed by: NURSE PRACTITIONER

## 2025-06-05 PROCEDURE — 3079F DIAST BP 80-89 MM HG: CPT | Performed by: NURSE PRACTITIONER

## 2025-06-05 PROCEDURE — 99213 OFFICE O/P EST LOW 20 MIN: CPT | Performed by: NURSE PRACTITIONER

## 2025-06-05 RX ORDER — LISINOPRIL 30 MG/1
30 TABLET ORAL DAILY
Qty: 30 TABLET | Refills: 6 | Status: SHIPPED | OUTPATIENT
Start: 2025-06-05 | End: 2026-01-01

## 2025-06-05 RX ORDER — AMLODIPINE BESYLATE 10 MG/1
TABLET ORAL
Qty: 30 TABLET | Refills: 6 | Status: SHIPPED | OUTPATIENT
Start: 2025-06-05

## 2025-06-05 RX ORDER — POLYETHYLENE GLYCOL-3350 AND ELECTROLYTES 236; 6.74; 5.86; 2.97; 22.74 G/274.31G; G/274.31G; G/274.31G; G/274.31G; G/274.31G
4000 POWDER, FOR SOLUTION ORAL ONCE
COMMUNITY
Start: 2025-03-28

## 2025-06-05 RX ORDER — ACETAMINOPHEN AND CODEINE PHOSPHATE 300; 30 MG/1; MG/1
TABLET ORAL
COMMUNITY
Start: 2025-05-20

## 2025-06-05 RX ORDER — HYDROCHLOROTHIAZIDE 25 MG/1
TABLET ORAL
COMMUNITY
Start: 2025-05-20 | End: 2025-06-05 | Stop reason: ALTCHOICE

## 2025-06-06 LAB — SURGICAL PATHOLOGY REPORT: NORMAL

## 2025-06-06 ASSESSMENT — ENCOUNTER SYMPTOMS
CHEST TIGHTNESS: 0
SINUS PAIN: 0
COUGH: 0
ABDOMINAL PAIN: 0
COLOR CHANGE: 0
NAUSEA: 0
SORE THROAT: 0
DIARRHEA: 0
BACK PAIN: 0
PHOTOPHOBIA: 0
SHORTNESS OF BREATH: 0
SINUS PRESSURE: 0
VOMITING: 0

## 2025-06-06 NOTE — PROGRESS NOTES
consistently been within the normal range across multiple checks.  - Current reading is 128/86.  - Refills for medications have been provided, and he prefers to pick them up every 30 days.  - Hydrochlorothiazide has been discontinued and will be removed from the medication list.    2. Colonoscopy follow-up.  - Colonoscopy performed yesterday, during which a large polyp was removed.  - Polyp sent for biopsy.  - He will be notified of the biopsy results once they are available.  - Follow-up in 6 months.    Hemoglobin A1C   Date Value Ref Range Status   03/05/2025 5.0 4.0 - 6.0 % Final        Return in about 6 months (around 12/5/2025).  No orders of the defined types were placed in this encounter.    Orders Placed This Encounter   Medications    amLODIPine (NORVASC) 10 MG tablet     Sig: take 1 tablet by mouth once daily     Dispense:  30 tablet     Refill:  6    lisinopril (PRINIVIL;ZESTRIL) 30 MG tablet     Sig: Take 1 tablet by mouth daily     Dispense:  30 tablet     Refill:  6       The patient (or guardian, if applicable) and other individuals in attendance with the patient were advised that Artificial Intelligence will be utilized during this visit to record, process the conversation to generate a clinical note, and support improvement of the AI technology. The patient (or guardian, if applicable) and other individuals in attendance at the appointment consented to the use of AI, including the recording.      Reviewed health maintenance, prior labs and imaging.   Patient given educational materials - see patient instructions.    Discussed use, benefit, and side effects of prescribed medications. Barriers to medication compliance addressed.   All patient questions answered.  Pt voiced understanding to plan of care.   Instructed to continue medications as discussed, healthy diet and exercise.    Patient agreed with treatment plan. Follow up as directed below.     This note is created with the assistance of moses

## 2025-07-15 ENCOUNTER — TELEPHONE (OUTPATIENT)
Dept: GASTROENTEROLOGY | Age: 50
End: 2025-07-15

## 2025-07-16 NOTE — TELEPHONE ENCOUNTER
Returned pt's call, gave results of colonoscopy and advised to come back in one year to repeat colonoscopy.

## (undated) DEVICE — NEEDLE SCLERO 25GA L240CM OD0.51MM ID0.24MM EXTN L4MM SHTH

## (undated) DEVICE — GOWN,POLY REINFORCED,LG: Brand: MEDLINE

## (undated) DEVICE — GLOVE ORANGE PI 8 1/2   MSG9085

## (undated) DEVICE — POLYP TRAP: Brand: TRAPEASE®

## (undated) DEVICE — GAUZE,SPONGE,4"X4",16PLY,STRL,LF,10/TRAY: Brand: MEDLINE

## (undated) DEVICE — ERBE NESSY® OMEGA PLATE USA (85+23)CM² , WITH CABLE 3 M: Brand: ERBE

## (undated) DEVICE — SNARE ENDOSCP L240CM LOOP W27MM SHTH DIA2.4MM WRK CHN 2.8MM

## (undated) DEVICE — Device: Brand: SPOT EX ENDOSCOPIC TATTOO

## (undated) DEVICE — ENDOSCOPIC KIT 1.1+ 10 FT OP4 CA DE